# Patient Record
Sex: FEMALE | Race: WHITE | NOT HISPANIC OR LATINO | Employment: FULL TIME | URBAN - METROPOLITAN AREA
[De-identification: names, ages, dates, MRNs, and addresses within clinical notes are randomized per-mention and may not be internally consistent; named-entity substitution may affect disease eponyms.]

---

## 2021-03-16 ENCOUNTER — BULK ORDERING (OUTPATIENT)
Dept: CASE MANAGEMENT | Facility: OTHER | Age: 61
End: 2021-03-16

## 2021-03-16 DIAGNOSIS — Z23 IMMUNIZATION DUE: ICD-10-CM

## 2021-08-10 ENCOUNTER — OFFICE (OUTPATIENT)
Dept: URBAN - METROPOLITAN AREA CLINIC 66 | Facility: CLINIC | Age: 61
End: 2021-08-10

## 2021-08-10 VITALS
WEIGHT: 156 LBS | SYSTOLIC BLOOD PRESSURE: 110 MMHG | DIASTOLIC BLOOD PRESSURE: 68 MMHG | HEART RATE: 74 BPM | HEIGHT: 65 IN

## 2021-08-10 DIAGNOSIS — K58.9 IRRITABLE BOWEL SYNDROME WITHOUT DIARRHEA: ICD-10-CM

## 2021-08-10 DIAGNOSIS — R63.4 ABNORMAL WEIGHT LOSS: ICD-10-CM

## 2021-08-10 DIAGNOSIS — K80.20 CALCULUS OF GALLBLADDER WITHOUT CHOLECYSTITIS WITHOUT OBSTRU: ICD-10-CM

## 2021-08-10 PROCEDURE — 99243 OFF/OP CNSLTJ NEW/EST LOW 30: CPT | Performed by: NURSE PRACTITIONER

## 2021-08-10 RX ORDER — NORTRIPTYLINE HYDROCHLORIDE 50 MG/1
50 CAPSULE ORAL
Qty: 30 | Refills: 11 | Status: COMPLETED
Start: 2021-08-10 | End: 2022-11-22

## 2021-10-05 ENCOUNTER — OFFICE (OUTPATIENT)
Dept: URBAN - METROPOLITAN AREA CLINIC 66 | Facility: CLINIC | Age: 61
End: 2021-10-05

## 2021-10-05 VITALS
DIASTOLIC BLOOD PRESSURE: 67 MMHG | HEIGHT: 65 IN | WEIGHT: 154 LBS | SYSTOLIC BLOOD PRESSURE: 122 MMHG | HEART RATE: 67 BPM

## 2021-10-05 DIAGNOSIS — K58.0 IRRITABLE BOWEL SYNDROME WITH DIARRHEA: ICD-10-CM

## 2021-10-05 PROCEDURE — 99213 OFFICE O/P EST LOW 20 MIN: CPT | Performed by: NURSE PRACTITIONER

## 2021-10-25 ENCOUNTER — TRANSCRIBE ORDERS (OUTPATIENT)
Dept: DIABETES SERVICES | Facility: HOSPITAL | Age: 61
End: 2021-10-25

## 2021-10-25 DIAGNOSIS — K58.9 IRRITABLE BOWEL SYNDROME, UNSPECIFIED TYPE: Primary | ICD-10-CM

## 2021-10-26 ENCOUNTER — HOSPITAL ENCOUNTER (OUTPATIENT)
Dept: DIABETES SERVICES | Facility: HOSPITAL | Age: 61
Discharge: HOME | End: 2021-10-26
Admitting: NURSE PRACTITIONER

## 2021-10-26 PROCEDURE — 97802 MEDICAL NUTRITION INDIV IN: CPT

## 2022-01-04 ENCOUNTER — OFFICE (OUTPATIENT)
Dept: URBAN - METROPOLITAN AREA CLINIC 66 | Facility: CLINIC | Age: 62
End: 2022-01-04

## 2022-01-04 VITALS
HEIGHT: 65 IN | HEART RATE: 81 BPM | DIASTOLIC BLOOD PRESSURE: 72 MMHG | WEIGHT: 148 LBS | SYSTOLIC BLOOD PRESSURE: 119 MMHG

## 2022-01-04 DIAGNOSIS — R10.84 GENERALIZED ABDOMINAL PAIN: ICD-10-CM

## 2022-01-04 DIAGNOSIS — R14.0 ABDOMINAL DISTENSION (GASEOUS): ICD-10-CM

## 2022-01-04 DIAGNOSIS — R19.7 DIARRHEA, UNSPECIFIED: ICD-10-CM

## 2022-01-04 PROCEDURE — 99214 OFFICE O/P EST MOD 30 MIN: CPT | Performed by: NURSE PRACTITIONER

## 2022-01-04 RX ORDER — COLESTIPOL HYDROCHLORIDE 1 G/1
1 TABLET, FILM COATED ORAL
Qty: 30 | Refills: 11 | Status: COMPLETED
Start: 2022-01-04 | End: 2022-03-29

## 2022-02-07 ENCOUNTER — TELEHEALTH PROVIDED IN PATIENT'S HOME (OUTPATIENT)
Dept: URBAN - METROPOLITAN AREA TELEHEALTH 5 | Facility: TELEHEALTH | Age: 62
End: 2022-02-07

## 2022-02-07 VITALS — HEIGHT: 65 IN

## 2022-02-07 DIAGNOSIS — R19.7 DIARRHEA, UNSPECIFIED: ICD-10-CM

## 2022-02-07 DIAGNOSIS — R63.4 ABNORMAL WEIGHT LOSS: ICD-10-CM

## 2022-02-07 PROCEDURE — 99213 OFFICE O/P EST LOW 20 MIN: CPT | Mod: 95 | Performed by: NURSE PRACTITIONER

## 2022-03-29 ENCOUNTER — TELEHEALTH PROVIDED IN PATIENT'S HOME (OUTPATIENT)
Dept: URBAN - METROPOLITAN AREA TELEHEALTH 5 | Facility: TELEHEALTH | Age: 62
End: 2022-03-29

## 2022-03-29 VITALS — WEIGHT: 148 LBS | HEIGHT: 65 IN

## 2022-03-29 DIAGNOSIS — R19.7 DIARRHEA, UNSPECIFIED: ICD-10-CM

## 2022-03-29 PROCEDURE — 99214 OFFICE O/P EST MOD 30 MIN: CPT | Performed by: NURSE PRACTITIONER

## 2022-03-29 RX ORDER — COLESTIPOL HYDROCHLORIDE 1 G/1
1 TABLET, FILM COATED ORAL
Qty: 30 | Refills: 11 | Status: COMPLETED
Start: 2022-01-04 | End: 2022-03-29

## 2022-03-29 RX ORDER — NORTRIPTYLINE HYDROCHLORIDE 50 MG/1
50 CAPSULE ORAL
Qty: 30 | Refills: 11 | Status: COMPLETED
Start: 2021-08-10 | End: 2022-11-22

## 2022-04-20 ENCOUNTER — TELEHEALTH PROVIDED IN PATIENT'S HOME (OUTPATIENT)
Dept: URBAN - METROPOLITAN AREA TELEHEALTH 5 | Facility: TELEHEALTH | Age: 62
End: 2022-04-20

## 2022-04-20 VITALS — HEIGHT: 65 IN

## 2022-04-20 DIAGNOSIS — K58.0 IRRITABLE BOWEL SYNDROME WITH DIARRHEA: ICD-10-CM

## 2022-04-20 PROCEDURE — 99214 OFFICE O/P EST MOD 30 MIN: CPT | Mod: 95 | Performed by: NURSE PRACTITIONER

## 2022-06-01 ENCOUNTER — OFFICE (OUTPATIENT)
Dept: URBAN - METROPOLITAN AREA CLINIC 66 | Facility: CLINIC | Age: 62
End: 2022-06-01

## 2022-06-01 VITALS
HEART RATE: 80 BPM | HEIGHT: 65 IN | SYSTOLIC BLOOD PRESSURE: 104 MMHG | WEIGHT: 152 LBS | DIASTOLIC BLOOD PRESSURE: 62 MMHG

## 2022-06-01 DIAGNOSIS — R14.2 ERUCTATION: ICD-10-CM

## 2022-06-01 DIAGNOSIS — R14.0 ABDOMINAL DISTENSION (GASEOUS): ICD-10-CM

## 2022-06-01 PROCEDURE — 99214 OFFICE O/P EST MOD 30 MIN: CPT | Performed by: INTERNAL MEDICINE

## 2022-06-01 RX ORDER — ALOSETRON HYDROCHLORIDE 0.5 MG/1
1 TABLET ORAL
Qty: 60 | Refills: 11 | Status: COMPLETED
Start: 2022-06-01 | End: 2022-11-22

## 2022-07-18 ENCOUNTER — OFFICE (OUTPATIENT)
Dept: URBAN - METROPOLITAN AREA CLINIC 66 | Facility: CLINIC | Age: 62
End: 2022-07-18

## 2022-07-18 VITALS
DIASTOLIC BLOOD PRESSURE: 67 MMHG | SYSTOLIC BLOOD PRESSURE: 109 MMHG | HEART RATE: 80 BPM | WEIGHT: 152 LBS | HEIGHT: 65 IN

## 2022-07-18 DIAGNOSIS — R19.7 DIARRHEA, UNSPECIFIED: ICD-10-CM

## 2022-07-18 PROCEDURE — 99213 OFFICE O/P EST LOW 20 MIN: CPT | Performed by: NURSE PRACTITIONER

## 2022-09-20 ENCOUNTER — TELEHEALTH PROVIDED IN PATIENT'S HOME (OUTPATIENT)
Dept: URBAN - METROPOLITAN AREA TELEHEALTH 5 | Facility: TELEHEALTH | Age: 62
End: 2022-09-20

## 2022-09-20 VITALS — HEIGHT: 65 IN | WEIGHT: 152 LBS

## 2022-09-20 DIAGNOSIS — K58.0 IRRITABLE BOWEL SYNDROME WITH DIARRHEA: ICD-10-CM

## 2022-09-20 PROCEDURE — 99215 OFFICE O/P EST HI 40 MIN: CPT | Mod: 95 | Performed by: NURSE PRACTITIONER

## 2022-09-20 RX ORDER — ONDANSETRON 4 MG/1
12 TABLET, ORALLY DISINTEGRATING ORAL
Qty: 30 | Refills: 1 | Status: ACTIVE
Start: 2022-09-20

## 2022-09-20 RX ORDER — NORTRIPTYLINE HYDROCHLORIDE 50 MG/1
50 CAPSULE ORAL
Qty: 30 | Refills: 11 | Status: COMPLETED
Start: 2021-08-10 | End: 2022-11-22

## 2022-11-01 ENCOUNTER — AMBULATORY SURGICAL CENTER (OUTPATIENT)
Dept: URBAN - METROPOLITAN AREA SURGERY 20 | Facility: SURGERY | Age: 62
End: 2022-11-01

## 2022-11-01 ENCOUNTER — OFFICE (OUTPATIENT)
Dept: URBAN - METROPOLITAN AREA PATHOLOGY 4 | Facility: PATHOLOGY | Age: 62
End: 2022-11-01

## 2022-11-01 VITALS — HEIGHT: 65 IN

## 2022-11-01 DIAGNOSIS — K31.89 OTHER DISEASES OF STOMACH AND DUODENUM: ICD-10-CM

## 2022-11-01 DIAGNOSIS — K44.9 DIAPHRAGMATIC HERNIA WITHOUT OBSTRUCTION OR GANGRENE: ICD-10-CM

## 2022-11-01 DIAGNOSIS — K64.0 FIRST DEGREE HEMORRHOIDS: ICD-10-CM

## 2022-11-01 DIAGNOSIS — K29.70 GASTRITIS, UNSPECIFIED, WITHOUT BLEEDING: ICD-10-CM

## 2022-11-01 DIAGNOSIS — R63.4 ABNORMAL WEIGHT LOSS: ICD-10-CM

## 2022-11-01 DIAGNOSIS — R10.84 GENERALIZED ABDOMINAL PAIN: ICD-10-CM

## 2022-11-01 DIAGNOSIS — R19.7 DIARRHEA, UNSPECIFIED: ICD-10-CM

## 2022-11-01 DIAGNOSIS — A04.9 BACTERIAL INTESTINAL INFECTION, UNSPECIFIED: ICD-10-CM

## 2022-11-01 DIAGNOSIS — K58.0 IRRITABLE BOWEL SYNDROME WITH DIARRHEA: ICD-10-CM

## 2022-11-01 DIAGNOSIS — K63.5 POLYP OF COLON: ICD-10-CM

## 2022-11-01 PROBLEM — K22.9 DISEASE OF ESOPHAGUS, UNSPECIFIED: Status: ACTIVE | Noted: 2022-11-01

## 2022-11-01 LAB
GI HISTOLOGY: A. SELECT: (no result)
GI HISTOLOGY: B. UNSPECIFIED: (no result)
GI HISTOLOGY: C. SELECT: (no result)
GI HISTOLOGY: D. SELECT: (no result)
GI HISTOLOGY: PDF REPORT: (no result)

## 2022-11-01 PROCEDURE — 43239 EGD BIOPSY SINGLE/MULTIPLE: CPT | Performed by: INTERNAL MEDICINE

## 2022-11-01 PROCEDURE — 45380 COLONOSCOPY AND BIOPSY: CPT | Performed by: INTERNAL MEDICINE

## 2022-11-01 PROCEDURE — 88305 TISSUE EXAM BY PATHOLOGIST: CPT | Performed by: INTERNAL MEDICINE

## 2022-11-01 NOTE — SERVICEHPINOTES
has had ibs d,  used variety of meds.   stool tests suggest EPI.  colestipol caused constipation,  dicylomine immodium helps but feels poorly.  lotronex not feasible.   on creon currently   the colestipol with other rx led to constipation, the immodium at 3-4 a day helpful to function.  she is on creon, but still has meal related urgency

## 2022-11-22 ENCOUNTER — OFFICE (OUTPATIENT)
Dept: URBAN - METROPOLITAN AREA CLINIC 66 | Facility: CLINIC | Age: 62
End: 2022-11-22

## 2022-11-22 VITALS — HEIGHT: 65 IN

## 2022-11-22 DIAGNOSIS — K58.0 IRRITABLE BOWEL SYNDROME WITH DIARRHEA: ICD-10-CM

## 2022-11-22 DIAGNOSIS — K86.81 EXOCRINE PANCREATIC INSUFFICIENCY: ICD-10-CM

## 2022-11-22 PROCEDURE — 99213 OFFICE O/P EST LOW 20 MIN: CPT | Performed by: INTERNAL MEDICINE

## 2023-03-20 ENCOUNTER — TRANSCRIBE ORDERS (OUTPATIENT)
Dept: ADMINISTRATIVE | Facility: HOSPITAL | Age: 63
End: 2023-03-20
Payer: COMMERCIAL

## 2023-03-20 DIAGNOSIS — Z12.2 SCREENING FOR LUNG CANCER: Primary | ICD-10-CM

## 2023-06-01 ENCOUNTER — OFFICE (OUTPATIENT)
Dept: URBAN - METROPOLITAN AREA CLINIC 66 | Facility: CLINIC | Age: 63
End: 2023-06-01

## 2023-06-01 VITALS
WEIGHT: 160 LBS | DIASTOLIC BLOOD PRESSURE: 63 MMHG | SYSTOLIC BLOOD PRESSURE: 110 MMHG | HEIGHT: 65 IN | HEART RATE: 71 BPM

## 2023-06-01 DIAGNOSIS — E55.9 VITAMIN D DEFICIENCY, UNSPECIFIED: ICD-10-CM

## 2023-06-01 DIAGNOSIS — E53.8 DEFICIENCY OF OTHER SPECIFIED B GROUP VITAMINS: ICD-10-CM

## 2023-06-01 DIAGNOSIS — K86.81 EXOCRINE PANCREATIC INSUFFICIENCY: ICD-10-CM

## 2023-06-01 DIAGNOSIS — K91.5 POSTCHOLECYSTECTOMY SYNDROME: ICD-10-CM

## 2023-06-01 DIAGNOSIS — K58.0 IRRITABLE BOWEL SYNDROME WITH DIARRHEA: ICD-10-CM

## 2023-06-01 DIAGNOSIS — J44.9 CHRONIC OBSTRUCTIVE PULMONARY DISEASE, UNSPECIFIED: ICD-10-CM

## 2023-06-01 DIAGNOSIS — Z79.82 LONG TERM (CURRENT) USE OF ASPIRIN: ICD-10-CM

## 2023-06-01 DIAGNOSIS — R11.0 NAUSEA: ICD-10-CM

## 2023-06-01 DIAGNOSIS — A04.9 BACTERIAL INTESTINAL INFECTION, UNSPECIFIED: ICD-10-CM

## 2023-06-01 PROCEDURE — 99214 OFFICE O/P EST MOD 30 MIN: CPT | Performed by: NURSE PRACTITIONER

## 2023-06-01 RX ORDER — COLESTIPOL HYDROCHLORIDE 1 G/1
1 TABLET, FILM COATED ORAL
Qty: 30 | Refills: 9 | Status: COMPLETED
Start: 2023-01-06 | End: 2023-06-01

## 2023-06-05 ENCOUNTER — HOSPITAL ENCOUNTER (OUTPATIENT)
Dept: CT IMAGING | Facility: HOSPITAL | Age: 63
Discharge: HOME | End: 2023-06-05
Admitting: INTERNAL MEDICINE
Payer: COMMERCIAL

## 2023-06-05 DIAGNOSIS — Z12.2 SCREENING FOR LUNG CANCER: ICD-10-CM

## 2023-06-05 PROCEDURE — 71271 CT THORAX LUNG CANCER SCR C-: CPT

## 2023-06-13 ENCOUNTER — HOSPITAL ENCOUNTER (OUTPATIENT)
Facility: HOSPITAL | Age: 63
Setting detail: OBSERVATION
LOS: 1 days | Discharge: HOME | End: 2023-06-16
Attending: INTERNAL MEDICINE | Admitting: INTERNAL MEDICINE
Payer: COMMERCIAL

## 2023-06-13 DIAGNOSIS — R79.89 ELEVATED LFTS: ICD-10-CM

## 2023-06-13 DIAGNOSIS — K80.64 CALCULUS OF GALLBLADDER AND BILE DUCT WITH CHRONIC CHOLECYSTITIS WITHOUT OBSTRUCTION: ICD-10-CM

## 2023-06-13 DIAGNOSIS — R00.2 PALPITATIONS: ICD-10-CM

## 2023-06-13 DIAGNOSIS — K80.50 CHOLEDOCHOLITHIASIS: ICD-10-CM

## 2023-06-13 DIAGNOSIS — K80.10 CALCULUS OF GALLBLADDER WITH CHRONIC CHOLECYSTITIS WITHOUT OBSTRUCTION: Primary | ICD-10-CM

## 2023-06-13 PROBLEM — R07.9 CHEST PAIN: Status: ACTIVE | Noted: 2023-06-13

## 2023-06-13 LAB
CREAT SERPL-MCNC: 0.75 MG/DL (ref 0.57–1)
EGFRCR SERPLBLD CKD-EPI 2021: 89 ML/MIN/1.73
GEN 5 2HR TROPONIN T REFLEX: 9 NG/L
TROPONIN T DELTA: 2 NG/L
TROPONIN T SERPL HS-MCNC: 7 NG/L

## 2023-06-13 PROCEDURE — G0378 HOSPITAL OBSERVATION PER HR: HCPCS

## 2023-06-13 PROCEDURE — 94640 AIRWAY INHALATION TREATMENT: CPT

## 2023-06-13 PROCEDURE — 82565 ASSAY OF CREATININE: CPT | Performed by: INTERNAL MEDICINE

## 2023-06-13 PROCEDURE — 25010000002 ENOXAPARIN PER 10 MG: Performed by: INTERNAL MEDICINE

## 2023-06-13 PROCEDURE — 84484 ASSAY OF TROPONIN QUANT: CPT | Performed by: INTERNAL MEDICINE

## 2023-06-13 RX ORDER — CHOLECALCIFEROL (VITAMIN D3) 125 MCG
1 TABLET ORAL WEEKLY
COMMUNITY

## 2023-06-13 RX ORDER — ENOXAPARIN SODIUM 100 MG/ML
40 INJECTION SUBCUTANEOUS EVERY 24 HOURS
Status: DISCONTINUED | OUTPATIENT
Start: 2023-06-13 | End: 2023-06-15

## 2023-06-13 RX ORDER — AMOXICILLIN 250 MG
2 CAPSULE ORAL 2 TIMES DAILY
Status: DISCONTINUED | OUTPATIENT
Start: 2023-06-13 | End: 2023-06-16 | Stop reason: HOSPADM

## 2023-06-13 RX ORDER — ALBUTEROL SULFATE 2.5 MG/3ML
2.5 SOLUTION RESPIRATORY (INHALATION) EVERY 6 HOURS PRN
Status: DISCONTINUED | OUTPATIENT
Start: 2023-06-13 | End: 2023-06-16 | Stop reason: HOSPADM

## 2023-06-13 RX ORDER — NORTRIPTYLINE HYDROCHLORIDE 25 MG/1
25 CAPSULE ORAL NIGHTLY
COMMUNITY

## 2023-06-13 RX ORDER — ATORVASTATIN CALCIUM 20 MG/1
20 TABLET, FILM COATED ORAL DAILY
COMMUNITY

## 2023-06-13 RX ORDER — ALBUTEROL SULFATE 90 UG/1
2 AEROSOL, METERED RESPIRATORY (INHALATION) EVERY 4 HOURS PRN
COMMUNITY

## 2023-06-13 RX ORDER — DICYCLOMINE HYDROCHLORIDE 10 MG/1
10 CAPSULE ORAL EVERY 6 HOURS PRN
Status: DISCONTINUED | OUTPATIENT
Start: 2023-06-13 | End: 2023-06-16 | Stop reason: HOSPADM

## 2023-06-13 RX ORDER — POLYETHYLENE GLYCOL 3350 17 G/17G
17 POWDER, FOR SOLUTION ORAL DAILY PRN
Status: DISCONTINUED | OUTPATIENT
Start: 2023-06-13 | End: 2023-06-16 | Stop reason: HOSPADM

## 2023-06-13 RX ORDER — BUDESONIDE AND FORMOTEROL FUMARATE DIHYDRATE 160; 4.5 UG/1; UG/1
2 AEROSOL RESPIRATORY (INHALATION)
COMMUNITY

## 2023-06-13 RX ORDER — ACETAMINOPHEN 325 MG/1
650 TABLET ORAL EVERY 4 HOURS PRN
Status: DISCONTINUED | OUTPATIENT
Start: 2023-06-13 | End: 2023-06-16 | Stop reason: HOSPADM

## 2023-06-13 RX ORDER — UREA 10 %
3 LOTION (ML) TOPICAL NIGHTLY PRN
Status: DISCONTINUED | OUTPATIENT
Start: 2023-06-13 | End: 2023-06-16 | Stop reason: HOSPADM

## 2023-06-13 RX ORDER — ATORVASTATIN CALCIUM 20 MG/1
20 TABLET, FILM COATED ORAL DAILY
Status: DISCONTINUED | OUTPATIENT
Start: 2023-06-14 | End: 2023-06-16 | Stop reason: HOSPADM

## 2023-06-13 RX ORDER — BUDESONIDE AND FORMOTEROL FUMARATE DIHYDRATE 160; 4.5 UG/1; UG/1
2 AEROSOL RESPIRATORY (INHALATION)
Status: DISCONTINUED | OUTPATIENT
Start: 2023-06-13 | End: 2023-06-16 | Stop reason: HOSPADM

## 2023-06-13 RX ORDER — BISACODYL 5 MG/1
5 TABLET, DELAYED RELEASE ORAL DAILY PRN
Status: DISCONTINUED | OUTPATIENT
Start: 2023-06-13 | End: 2023-06-16 | Stop reason: HOSPADM

## 2023-06-13 RX ORDER — BISACODYL 10 MG
10 SUPPOSITORY, RECTAL RECTAL DAILY PRN
Status: DISCONTINUED | OUTPATIENT
Start: 2023-06-13 | End: 2023-06-16 | Stop reason: HOSPADM

## 2023-06-13 RX ORDER — DICYCLOMINE HYDROCHLORIDE 10 MG/1
10 CAPSULE ORAL AS NEEDED
COMMUNITY

## 2023-06-13 RX ORDER — ONDANSETRON 4 MG/1
4 TABLET, FILM COATED ORAL EVERY 6 HOURS PRN
Status: DISCONTINUED | OUTPATIENT
Start: 2023-06-13 | End: 2023-06-16 | Stop reason: HOSPADM

## 2023-06-13 RX ORDER — NORTRIPTYLINE HYDROCHLORIDE 25 MG/1
25 CAPSULE ORAL NIGHTLY
Status: DISCONTINUED | OUTPATIENT
Start: 2023-06-13 | End: 2023-06-16 | Stop reason: HOSPADM

## 2023-06-13 RX ORDER — ONDANSETRON 4 MG/1
4 TABLET, FILM COATED ORAL EVERY 8 HOURS PRN
Status: ON HOLD | COMMUNITY
End: 2023-06-16 | Stop reason: SDUPTHER

## 2023-06-13 RX ORDER — ONDANSETRON 2 MG/ML
4 INJECTION INTRAMUSCULAR; INTRAVENOUS EVERY 6 HOURS PRN
Status: DISCONTINUED | OUTPATIENT
Start: 2023-06-13 | End: 2023-06-16 | Stop reason: HOSPADM

## 2023-06-13 RX ADMIN — ENOXAPARIN SODIUM 40 MG: 100 INJECTION SUBCUTANEOUS at 21:28

## 2023-06-13 RX ADMIN — BUDESONIDE AND FORMOTEROL FUMARATE DIHYDRATE 2 PUFF: 160; 4.5 AEROSOL RESPIRATORY (INHALATION) at 22:10

## 2023-06-14 ENCOUNTER — INPATIENT HOSPITAL (OUTPATIENT)
Age: 63
End: 2023-06-14
Payer: COMMERCIAL

## 2023-06-14 ENCOUNTER — INPATIENT HOSPITAL (OUTPATIENT)
Dept: URBAN - METROPOLITAN AREA HOSPITAL 113 | Facility: HOSPITAL | Age: 63
End: 2023-06-14
Payer: COMMERCIAL

## 2023-06-14 ENCOUNTER — APPOINTMENT (OUTPATIENT)
Dept: CARDIOLOGY | Facility: HOSPITAL | Age: 63
End: 2023-06-14
Payer: COMMERCIAL

## 2023-06-14 ENCOUNTER — APPOINTMENT (OUTPATIENT)
Dept: NUCLEAR MEDICINE | Facility: HOSPITAL | Age: 63
End: 2023-06-14
Payer: COMMERCIAL

## 2023-06-14 ENCOUNTER — APPOINTMENT (OUTPATIENT)
Dept: ULTRASOUND IMAGING | Facility: HOSPITAL | Age: 63
End: 2023-06-14
Payer: COMMERCIAL

## 2023-06-14 DIAGNOSIS — R07.9 CHEST PAIN, UNSPECIFIED: ICD-10-CM

## 2023-06-14 PROBLEM — G47.33 OSA (OBSTRUCTIVE SLEEP APNEA): Chronic | Status: ACTIVE | Noted: 2023-06-14

## 2023-06-14 PROBLEM — R79.89 ELEVATED LFTS: Status: ACTIVE | Noted: 2023-06-14

## 2023-06-14 PROBLEM — E78.5 HYPERLIPIDEMIA: Chronic | Status: ACTIVE | Noted: 2023-06-14

## 2023-06-14 PROBLEM — R10.13 EPIGASTRIC PAIN: Status: ACTIVE | Noted: 2023-06-14

## 2023-06-14 PROBLEM — K58.9 IBS (IRRITABLE BOWEL SYNDROME): Chronic | Status: ACTIVE | Noted: 2023-06-14

## 2023-06-14 PROBLEM — J44.9 COPD (CHRONIC OBSTRUCTIVE PULMONARY DISEASE): Chronic | Status: ACTIVE | Noted: 2023-06-14

## 2023-06-14 PROBLEM — K80.20 CHOLELITHIASIS: Status: ACTIVE | Noted: 2023-06-14

## 2023-06-14 LAB
ALBUMIN SERPL-MCNC: 4.2 G/DL (ref 3.5–5.2)
ALP SERPL-CCNC: 152 U/L (ref 39–117)
ALT SERPL W P-5'-P-CCNC: 574 U/L (ref 1–33)
ANION GAP SERPL CALCULATED.3IONS-SCNC: 13.4 MMOL/L (ref 5–15)
AORTIC DIMENSIONLESS INDEX: 0.9 (DI)
ASCENDING AORTA: 2.6 CM
AST SERPL-CCNC: 136 U/L (ref 1–32)
BH CV ECHO MEAS - ACS: 2.06 CM
BH CV ECHO MEAS - AO MAX PG: 5.6 MMHG
BH CV ECHO MEAS - AO MEAN PG: 2.9 MMHG
BH CV ECHO MEAS - AO ROOT DIAM: 2.9 CM
BH CV ECHO MEAS - AO V2 MAX: 118.1 CM/SEC
BH CV ECHO MEAS - AO V2 VTI: 20.8 CM
BH CV ECHO MEAS - AVA(I,D): 2.2 CM2
BH CV ECHO MEAS - EDV(CUBED): 52.7 ML
BH CV ECHO MEAS - EDV(MOD-SP2): 68 ML
BH CV ECHO MEAS - EDV(MOD-SP4): 73 ML
BH CV ECHO MEAS - EF(MOD-BP): 62.4 %
BH CV ECHO MEAS - EF(MOD-SP2): 61.8 %
BH CV ECHO MEAS - EF(MOD-SP4): 64.4 %
BH CV ECHO MEAS - ESV(CUBED): 16 ML
BH CV ECHO MEAS - ESV(MOD-SP2): 26 ML
BH CV ECHO MEAS - ESV(MOD-SP4): 26 ML
BH CV ECHO MEAS - FS: 32.8 %
BH CV ECHO MEAS - IVS/LVPW: 1.13 CM
BH CV ECHO MEAS - IVSD: 0.99 CM
BH CV ECHO MEAS - LAT PEAK E' VEL: 7.9 CM/SEC
BH CV ECHO MEAS - LV MASS(C)D: 104.6 GRAMS
BH CV ECHO MEAS - LV MAX PG: 4 MMHG
BH CV ECHO MEAS - LV MEAN PG: 1.71 MMHG
BH CV ECHO MEAS - LV V1 MAX: 100.3 CM/SEC
BH CV ECHO MEAS - LV V1 VTI: 18.3 CM
BH CV ECHO MEAS - LVIDD: 3.7 CM
BH CV ECHO MEAS - LVIDS: 2.5 CM
BH CV ECHO MEAS - LVOT AREA: 2.5 CM2
BH CV ECHO MEAS - LVOT DIAM: 1.78 CM
BH CV ECHO MEAS - LVPWD: 0.88 CM
BH CV ECHO MEAS - MED PEAK E' VEL: 7 CM/SEC
BH CV ECHO MEAS - MR MAX PG: 77.5 MMHG
BH CV ECHO MEAS - MR MAX VEL: 440.1 CM/SEC
BH CV ECHO MEAS - MV A DUR: 0.15 SEC
BH CV ECHO MEAS - MV A MAX VEL: 92.1 CM/SEC
BH CV ECHO MEAS - MV DEC SLOPE: 346.5 CM/SEC2
BH CV ECHO MEAS - MV DEC TIME: 0.25 MSEC
BH CV ECHO MEAS - MV E MAX VEL: 55.3 CM/SEC
BH CV ECHO MEAS - MV E/A: 0.6
BH CV ECHO MEAS - MV MAX PG: 4.6 MMHG
BH CV ECHO MEAS - MV MEAN PG: 1.56 MMHG
BH CV ECHO MEAS - MV P1/2T: 70 MSEC
BH CV ECHO MEAS - MV V2 VTI: 23.9 CM
BH CV ECHO MEAS - MVA(P1/2T): 3.1 CM2
BH CV ECHO MEAS - MVA(VTI): 1.92 CM2
BH CV ECHO MEAS - PA ACC TIME: 0.12 SEC
BH CV ECHO MEAS - PA V2 MAX: 112.7 CM/SEC
BH CV ECHO MEAS - PULM A REVS DUR: 0.12 SEC
BH CV ECHO MEAS - PULM A REVS VEL: 60.1 CM/SEC
BH CV ECHO MEAS - PULM DIAS VEL: 29.6 CM/SEC
BH CV ECHO MEAS - PULM S/D: 1.33
BH CV ECHO MEAS - PULM SYS VEL: 39.3 CM/SEC
BH CV ECHO MEAS - QP/QS: 0.91
BH CV ECHO MEAS - RAP SYSTOLE: 3 MMHG
BH CV ECHO MEAS - RV MAX PG: 3.1 MMHG
BH CV ECHO MEAS - RV V1 MAX: 87.4 CM/SEC
BH CV ECHO MEAS - RV V1 VTI: 15.4 CM
BH CV ECHO MEAS - RVOT DIAM: 1.85 CM
BH CV ECHO MEAS - RVSP: 24 MMHG
BH CV ECHO MEAS - SV(LVOT): 45.8 ML
BH CV ECHO MEAS - SV(MOD-SP2): 42 ML
BH CV ECHO MEAS - SV(MOD-SP4): 47 ML
BH CV ECHO MEAS - SV(RVOT): 41.4 ML
BH CV ECHO MEAS - TAPSE (>1.6): 1.54 CM
BH CV ECHO MEAS - TR MAX PG: 21.3 MMHG
BH CV ECHO MEAS - TR MAX VEL: 230.8 CM/SEC
BH CV ECHO MEASUREMENTS AVERAGE E/E' RATIO: 7.42
BH CV XLRA - RV BASE: 2.41 CM
BH CV XLRA - RV LENGTH: 5.9 CM
BH CV XLRA - RV MID: 2.28 CM
BH CV XLRA - TDI S': 12.4 CM/SEC
BILIRUB CONJ SERPL-MCNC: <0.2 MG/DL (ref 0–0.3)
BILIRUB INDIRECT SERPL-MCNC: ABNORMAL MG/DL
BILIRUB SERPL-MCNC: 0.7 MG/DL (ref 0–1.2)
BUN SERPL-MCNC: 13 MG/DL (ref 8–23)
BUN/CREAT SERPL: 15.1 (ref 7–25)
CALCIUM SPEC-SCNC: 9.6 MG/DL (ref 8.6–10.5)
CHLORIDE SERPL-SCNC: 102 MMOL/L (ref 98–107)
CO2 SERPL-SCNC: 22.6 MMOL/L (ref 22–29)
CREAT SERPL-MCNC: 0.86 MG/DL (ref 0.57–1)
DEPRECATED RDW RBC AUTO: 43 FL (ref 37–54)
EGFRCR SERPLBLD CKD-EPI 2021: 75.5 ML/MIN/1.73
ERYTHROCYTE [DISTWIDTH] IN BLOOD BY AUTOMATED COUNT: 12.5 % (ref 12.3–15.4)
GLUCOSE SERPL-MCNC: 83 MG/DL (ref 65–99)
HCT VFR BLD AUTO: 40.3 % (ref 34–46.6)
HGB BLD-MCNC: 13.7 G/DL (ref 12–15.9)
LEFT ATRIUM VOLUME INDEX: 12 ML/M2
MCH RBC QN AUTO: 32.2 PG (ref 26.6–33)
MCHC RBC AUTO-ENTMCNC: 34 G/DL (ref 31.5–35.7)
MCV RBC AUTO: 94.6 FL (ref 79–97)
PLATELET # BLD AUTO: 329 10*3/MM3 (ref 140–450)
PMV BLD AUTO: 9.6 FL (ref 6–12)
POTASSIUM SERPL-SCNC: 3.7 MMOL/L (ref 3.5–5.2)
PROT SERPL-MCNC: 7 G/DL (ref 6–8.5)
QT INTERVAL: 369 MS
RBC # BLD AUTO: 4.26 10*6/MM3 (ref 3.77–5.28)
SINUS: 2.6 CM
SODIUM SERPL-SCNC: 138 MMOL/L (ref 136–145)
STJ: 2.24 CM
WBC NRBC COR # BLD: 7.83 10*3/MM3 (ref 3.4–10.8)

## 2023-06-14 PROCEDURE — 99221 1ST HOSP IP/OBS SF/LOW 40: CPT | Performed by: INTERNAL MEDICINE

## 2023-06-14 PROCEDURE — 99204 OFFICE O/P NEW MOD 45 MIN: CPT | Performed by: INTERNAL MEDICINE

## 2023-06-14 PROCEDURE — G0378 HOSPITAL OBSERVATION PER HR: HCPCS

## 2023-06-14 PROCEDURE — 94799 UNLISTED PULMONARY SVC/PX: CPT

## 2023-06-14 PROCEDURE — 80048 BASIC METABOLIC PNL TOTAL CA: CPT | Performed by: INTERNAL MEDICINE

## 2023-06-14 PROCEDURE — 25010000002 SINCALIDE PER 5 MCG: Performed by: INTERNAL MEDICINE

## 2023-06-14 PROCEDURE — 78227 HEPATOBIL SYST IMAGE W/DRUG: CPT

## 2023-06-14 PROCEDURE — 25010000002 ENOXAPARIN PER 10 MG: Performed by: INTERNAL MEDICINE

## 2023-06-14 PROCEDURE — 25510000001 PERFLUTREN (DEFINITY) 8.476 MG IN SODIUM CHLORIDE (PF) 0.9 % 10 ML INJECTION: Performed by: INTERNAL MEDICINE

## 2023-06-14 PROCEDURE — 76705 ECHO EXAM OF ABDOMEN: CPT

## 2023-06-14 PROCEDURE — 93306 TTE W/DOPPLER COMPLETE: CPT

## 2023-06-14 PROCEDURE — 85027 COMPLETE CBC AUTOMATED: CPT | Performed by: INTERNAL MEDICINE

## 2023-06-14 PROCEDURE — 94761 N-INVAS EAR/PLS OXIMETRY MLT: CPT

## 2023-06-14 PROCEDURE — A9537 TC99M MEBROFENIN: HCPCS | Performed by: STUDENT IN AN ORGANIZED HEALTH CARE EDUCATION/TRAINING PROGRAM

## 2023-06-14 PROCEDURE — 94664 DEMO&/EVAL PT USE INHALER: CPT

## 2023-06-14 PROCEDURE — 0 TECHNETIUM TC 99M MEBROFENIN KIT: Performed by: STUDENT IN AN ORGANIZED HEALTH CARE EDUCATION/TRAINING PROGRAM

## 2023-06-14 PROCEDURE — 93005 ELECTROCARDIOGRAM TRACING: CPT | Performed by: STUDENT IN AN ORGANIZED HEALTH CARE EDUCATION/TRAINING PROGRAM

## 2023-06-14 PROCEDURE — 80076 HEPATIC FUNCTION PANEL: CPT | Performed by: INTERNAL MEDICINE

## 2023-06-14 RX ORDER — KIT FOR THE PREPARATION OF TECHNETIUM TC 99M MEBROFENIN 45 MG/10ML
1 INJECTION, POWDER, LYOPHILIZED, FOR SOLUTION INTRAVENOUS
Status: COMPLETED | OUTPATIENT
Start: 2023-06-14 | End: 2023-06-14

## 2023-06-14 RX ADMIN — NORTRIPTYLINE HYDROCHLORIDE 25 MG: 25 CAPSULE ORAL at 00:06

## 2023-06-14 RX ADMIN — PANCRELIPASE 36000 UNITS OF LIPASE: 60000; 12000; 38000 CAPSULE, DELAYED RELEASE PELLETS ORAL at 12:21

## 2023-06-14 RX ADMIN — ATORVASTATIN CALCIUM 20 MG: 20 TABLET, FILM COATED ORAL at 11:09

## 2023-06-14 RX ADMIN — ENOXAPARIN SODIUM 40 MG: 100 INJECTION SUBCUTANEOUS at 20:45

## 2023-06-14 RX ADMIN — SODIUM CHLORIDE 1.4 MCG: 9 INJECTION, SOLUTION INTRAVENOUS at 09:29

## 2023-06-14 RX ADMIN — NORTRIPTYLINE HYDROCHLORIDE 25 MG: 25 CAPSULE ORAL at 20:45

## 2023-06-14 RX ADMIN — BUDESONIDE AND FORMOTEROL FUMARATE DIHYDRATE 2 PUFF: 160; 4.5 AEROSOL RESPIRATORY (INHALATION) at 20:17

## 2023-06-14 RX ADMIN — BUDESONIDE AND FORMOTEROL FUMARATE DIHYDRATE 2 PUFF: 160; 4.5 AEROSOL RESPIRATORY (INHALATION) at 07:00

## 2023-06-14 RX ADMIN — PERFLUTREN 3 ML: 6.52 INJECTION, SUSPENSION INTRAVENOUS at 14:56

## 2023-06-14 RX ADMIN — MEBROFENIN 1 DOSE: 45 INJECTION, POWDER, LYOPHILIZED, FOR SOLUTION INTRAVENOUS at 07:38

## 2023-06-14 NOTE — H&P
HISTORY AND PHYSICAL   [facility]        Date of Admission: 2023  Patient Identification:  Name: Nani Matias  Age: 62 y.o.  Sex: female  :  1960  MRN: [id number]                     Primary Care Physician: Robyn Morris MD    Chief Complaint:  62 year old female who presented to the emergency room with epigastric pain as well as palpitations; she had a first episode two days ago, another yesterday and was awakened with it this morning; she was seen at another ed and had gallstones and elevated transaminases; she has a history of ibs and is followed by dr mendez; she has no personal or family history of heart disease    History of Present Illness:   As above    Past Medical History:  Ibs  Copd  Sleep apnea  Past Surgical History:  History reviewed. No pertinent surgical history.   Home Meds:  Medications Prior to Admission   Medication Sig Dispense Refill Last Dose    atorvastatin (LIPITOR) 20 MG tablet Take 1 tablet by mouth Daily.   2023    budesonide-formoterol (SYMBICORT) 160-4.5 MCG/ACT inhaler Inhale 2 puffs 2 (Two) Times a Day.   2023    dicyclomine (BENTYL) 10 MG capsule Take 1 capsule by mouth As Needed for Abdominal Cramping.   2023    Ergocalciferol (Vitamin D2) 50 MCG (2000 UT) tablet Take 1 tablet by mouth 1 (One) Time Per Week.   Past Week    nortriptyline (PAMELOR) 25 MG capsule Take 1 capsule by mouth Every Night.   2023    pancrelipase, Lip-Prot-Amyl, (CREON) 93531-15349 units capsule delayed-release particles capsule Take 3 capsules by mouth 3 (Three) Times a Day With Meals.   Past Week    albuterol sulfate  (90 Base) MCG/ACT inhaler Inhale 2 puffs Every 4 (Four) Hours As Needed for Wheezing.   More than a month    ondansetron (ZOFRAN) 4 MG tablet Take 1 tablet by mouth Every 8 (Eight) Hours As Needed for Nausea or Vomiting.   More than a month       Allergies:  Allergies   Allergen Reactions    Penicillins Angioedema     "Shellfish-Derived Products Anaphylaxis     Immunizations:    There is no immunization history on file for this patient.  Social History:   Social History     Social History Narrative    Not on file     Social History     Socioeconomic History    Marital status:    Tobacco Use    Smoking status: Former     Types: Cigarettes     Quit date: 2009     Years since quittin.9    Smokeless tobacco: Never   Vaping Use    Vaping Use: Never used   Substance and Sexual Activity    Alcohol use: Never    Drug use: Never    Sexual activity: Defer       Family History:  History reviewed. No pertinent family history.     Review of Systems  See history of present illness and past medical history.  Patient denies headache, dizziness, syncope, falls, trauma, change in vision, change in hearing, change in taste, changes in weight, changes in appetite, focal weakness, numbness, or paresthesia.  Patient denies   dyspnea, orthopnea, PND, cough, sinus pressure, rhinorrhea, epistaxis, hemoptysis,    hematemesis, diarrhea, constipation or hematochezia.  Denies cold or heat intolerance, polydipsia, polyuria, polyphagia. Denies hematuria, pyuria, dysuria, hesitancy, frequency or urgency. Denies consumption of raw and under cooked meats foods or change in water source.  Denies fever, chills, sweats, night sweats.  Denies missing any routine medications. Remainder of ROS is negative.    Objective:  T Max 24 hrs: Temp (24hrs), Av.6 °F (36.4 °C), Min:97.6 °F (36.4 °C), Max:97.6 °F (36.4 °C)    Vitals Ranges:   Temp:  [97.6 °F (36.4 °C)] 97.6 °F (36.4 °C)  Heart Rate:  [84-89] 89  Resp:  [16] 16  BP: (119)/(75) 119/75      Exam:  /75 (BP Location: Left arm, Patient Position: Lying)   Pulse 89   Temp 97.6 °F (36.4 °C) (Oral)   Resp 16   Ht 163 cm (64.17\")   Wt 68.3 kg (150 lb 9.2 oz)   SpO2 97%   BMI 25.71 kg/m²     General Appearance:    Alert, cooperative, no distress, appears stated age   Head:    Normocephalic, " without obvious abnormality, atraumatic   Eyes:    PERRL, conjunctivae/corneas clear, EOM's intact, both eyes   Ears:    Normal external ear canals, both ears   Nose:   Nares normal, septum midline, mucosa normal, no drainage    or sinus tenderness   Throat:   Lips, mucosa, and tongue normal   Neck:   Supple, symmetrical, trachea midline, no adenopathy;     thyroid:  no enlargement/tenderness/nodules; no carotid    bruit or JVD   Back:     Symmetric, no curvature, ROM normal, no CVA tenderness   Lungs:     Decreased breath sounds bilaterally, respirations unlabored   Chest Wall:    No tenderness or deformity    Heart:    Regular rate and rhythm, S1 and S2 normal, no murmur, rub   or gallop   Abdomen:     Soft, nontender, bowel sounds active all four quadrants,     no masses, no hepatomegaly, no splenomegaly   Extremities:   Extremities normal, atraumatic, no cyanosis or edema   Pulses:   2+ and symmetric all extremities   Skin:   Skin color, texture, turgor normal, no rashes or lesions               .    Data Review:  Labs in chart were reviewed.             Imaging Results (All)       None              Assessment:  Active Hospital Problems    Diagnosis  POA    **Chest pain [R07.9]  Yes      Resolved Hospital Problems   No resolved problems to display.   Nausea and vomiting  Copd  Sleep apnea  Hyperlipidemia  Ibs      Plan:  Will get a hida scan  Check echo  Repeat troponin  First was negative at the other facility  Monitor on telemetry  Dw patient and ed provider    Sarah Landry MD  6/13/2023  20:09 EDT

## 2023-06-14 NOTE — CONSULTS
[facility]   Consult Note    Patient Name: Nani Matias  : 1960  MRN: [id number]  Primary Care Physician:  Robyn Morris MD  Referring Physician: Dirk Lott MD  Date of admission: 2023    Inpatient Gastroenterology Consult  Consult performed by: Patrick Simmons MD  Consult ordered by: Sarah Landry MD      Subjective   Subjective     Reason for Consult/ Chief Complaint: abd pain     History of Present Illness  Nani Matias is a 62 y.o. female patient presented with upper abdominal pain.  This was upper middle to right side, did radiate to her back.  Some nausea/ vomiting and diarrhea.   She has had irritable bowel syndrome.  She did have a bump in liver function tests that are improving with these symptoms . She has had gallstones. And has seen dru brown in the past but it was felt at the time they were not an issue as her known irritable bowel syndrome was active.  No tea colored urine or pale stools .  She did have upper and lower tract scopes 2022 were not remarkable     Review of Systems   Gastrointestinal:  Positive for abdominal pain.      Personal History     History reviewed. No pertinent past medical history.    History reviewed. No pertinent surgical history.    Family History: family history is not on file. Otherwise pertinent FHx was reviewed and not pertinent to current issue.    Social History:  reports that she quit smoking about 13 years ago. Her smoking use included cigarettes. She has never used smokeless tobacco. She reports that she does not drink alcohol and does not use drugs.    Home Medications:   Vitamin D2, albuterol sulfate HFA, atorvastatin, budesonide-formoterol, dicyclomine, nortriptyline, ondansetron, and pancrelipase (Lip-Prot-Amyl)    Allergies:  Allergies   Allergen Reactions    Penicillins Angioedema    Shellfish-Derived Products Anaphylaxis       Objective    Objective     Vitals:  Temp:  [97.6 °F (36.4 °C)-98.3 °F (36.8 °C)]  98.3 °F (36.8 °C)  Heart Rate:  [79-90] 84  Resp:  [16-20] 18  BP: (109-119)/(62-76) 112/62    Physical Exam  HENT:      Right Ear: External ear normal.      Left Ear: External ear normal.      Mouth/Throat:      Pharynx: Oropharynx is clear.   Eyes:      Conjunctiva/sclera: Conjunctivae normal.   Cardiovascular:      Rate and Rhythm: Normal rate.   Pulmonary:      Effort: Pulmonary effort is normal.   Abdominal:      General: Abdomen is flat.   Skin:     General: Skin is warm and dry.   Neurological:      General: No focal deficit present.      Mental Status: She is alert.   Psychiatric:         Mood and Affect: Mood normal.       Result Review    Result Review:  I have personally reviewed the results from the time of this admission to 6/14/2023 07:58 EDT and agree with these findings:  []  Laboratory list / accordion  []  Microbiology  []  Radiology  []  EKG/Telemetry   []  Cardiology/Vascular   []  Pathology  []  Old records  []  Other:  Most notable findings include:       Assessment & Plan   Assessment / Plan     Brief Patient Summary:  Nani Matias is a 62 y.o. female who   Abd pain concern over biliary colic given the symptoms and increased liver function tests  Irritable bowel syndrome     Active Hospital Problems:  Active Hospital Problems    Diagnosis     **Chest pain      Plan: agree with hida scan  Consider surgical opinion  Patient was asking about cardiology input, I have messaged the admitting md on this , however this may be biliary colic given the presentation       Patrick Simmons MD

## 2023-06-14 NOTE — PROGRESS NOTES
Name: Nani Matias ADMIT: 2023   : 1960  PCP: Robyn Morris MD    MRN: [id number] LOS: 1 days   AGE/SEX: 62 y.o. female  ROOM: [facility]/     Subjective   Subjective     She reports that her abdominal pain is improved today and overall she feels a bit better. She requested a cardiology evaluation with regard to her palpitations and this has been ordered. Getting more history on her chest/epigastric pain and palpitations, it seems that the epigastric pain woke her from sleep a few of nights ago, and then she had palpitations that woke her from sleep the night prior to admission. The epigastric pain seems to be associated with eating, and the palpitations are sometimes associated with pain or cold sweats. She does endorse some occasional very mild fleeting chest discomfort that is pressure like, but it only lasts a moment, is non-radiating, and not worsened or improved by anything. She denies any worsening orthopnea or exertional dyspnea (she has both at baseline from her COPD) or lower extremity swelling. Her hida scan this morning was normal       Objective   Objective   Vital Signs  Temp:  [97.6 °F (36.4 °C)-98.3 °F (36.8 °C)] 98.3 °F (36.8 °C)  Heart Rate:  [79-90] 84  Resp:  [16-20] 18  BP: (109-119)/(62-76) 112/62  SpO2:  [94 %-97 %] 97 %  on   ;   Device (Oxygen Therapy): room air  Body mass index is 26.24 kg/m².  Physical Exam  Constitutional:       General: She is not in acute distress.     Appearance: She is not toxic-appearing.   Cardiovascular:      Rate and Rhythm: Normal rate and regular rhythm.      Heart sounds: Normal heart sounds.   Pulmonary:      Effort: Pulmonary effort is normal.      Breath sounds: Normal breath sounds.   Abdominal:      General: Bowel sounds are normal.      Palpations: Abdomen is soft.   Musculoskeletal:         General: No tenderness.      Right lower leg: No edema.      Left lower leg: No edema.   Neurological:      Mental Status: She is alert.    Psychiatric:         Mood and Affect: Mood normal.         Behavior: Behavior normal.       Results Review     I reviewed the patient's new clinical results.  Results from last 7 days   Lab Units 06/14/23  0320   WBC 10*3/mm3 7.83   HEMOGLOBIN g/dL 13.7   PLATELETS 10*3/mm3 329     Results from last 7 days   Lab Units 06/14/23  0320 06/13/23  1907   SODIUM mmol/L 138  --    POTASSIUM mmol/L 3.7  --    CHLORIDE mmol/L 102  --    CO2 mmol/L 22.6  --    BUN mg/dL 13  --    CREATININE mg/dL 0.86 0.75   GLUCOSE mg/dL 83  --    Estimated Creatinine Clearance: 63.5 mL/min (by C-G formula based on SCr of 0.86 mg/dL).  Results from last 7 days   Lab Units 06/14/23  0320   ALBUMIN g/dL 4.2   BILIRUBIN mg/dL 0.7   ALK PHOS U/L 152*   AST (SGOT) U/L 136*   ALT (SGPT) U/L 574*     Results from last 7 days   Lab Units 06/14/23  0320   CALCIUM mg/dL 9.6   ALBUMIN g/dL 4.2     No results found for: COVID19  No results found for: HGBA1C, POCGLU    NM HIDA SCAN WITH PHARMACOLOGICAL INTERVENTION  Narrative: NUCLEAR MEDICINE HIDA SCAN WITH PHARMACOLOGIC INTERVENTION     HISTORY: Abdominal pain.     TECHNIQUE: 45 minute anterior dynamic imaging over the abdomen was  performed after injection of 5.7 mCi Tc-choletec IV.  1.4 mcg Kinevac  was administered IV to determine gallbladder ejection fraction.     FINDINGS: There is normal hepatic uptake and excretion with appropriate  clearance of background blood pool activity. There is normal  visualization of biliary tract, gallbladder, small bowel. After CCK  infusion, gallbladder ejection fraction is calculated to be  91%   (normal range is considered 35% or greater)     Impression: 1. No common duct or cystic duct obstruction.  2. Gallbladder ejection fraction measures 91 % which is normal.     This report was finalized on 6/14/2023 12:12 PM by Dr. Scott Rich M.D.       Scheduled Medications  atorvastatin, 20 mg, Oral, Daily  budesonide-formoterol, 2 puff, Inhalation, BID -  RT  enoxaparin, 40 mg, Subcutaneous, Q24H  nortriptyline, 25 mg, Oral, Nightly  pancrelipase (Lip-Prot-Amyl), 36,000 units of lipase, Oral, TID With Meals  senna-docusate sodium, 2 tablet, Oral, BID    Infusions   Diet  Diet: Cardiac Diets; Healthy Heart (2-3 Na+); Texture: Regular Texture (IDDSI 7); Fluid Consistency: Thin (IDDSI 0)       Assessment/Plan     Active Hospital Problems    Diagnosis  POA    **Chest pain [R07.9]  Yes    Epigastric pain [R10.13]  Yes    IBS (irritable bowel syndrome) [K58.9]  Yes    COPD (chronic obstructive pulmonary disease) [J44.9]  Yes    Hyperlipidemia [E78.5]  Yes    OZ (obstructive sleep apnea) [G47.33]  Yes    Cholelithiasis [K80.20]  Yes    Elevated LFTs [R79.89]  Yes      Resolved Hospital Problems   No resolved problems to display.       62 y.o. female admitted with Chest pain.    Epigastric pain with elevated LFTs and sludge or small stones in the gallbladder on CT-hida scan was negative. General surgery has been consulted  Chest discomfort with palpitations-non-cardiac chest pain by description. Her EKG is without any ST deviation or t wave inversion. Troponin has been negative multiple times. An echocardiogram is pending and cardiology has been consulted.  COPD-symbicort  Hyperlipidemia-atorvastatin  IBS-nortriptyline   OZ-cpap if available  SCDs for DVT prophylaxis.  Full code.  Discussed with patient, nursing staff, and consulting provider.  Anticipate discharge home timing yet to be determined.      Beau Alicea MD  [facility]  06/14/23  12:55 EDT    I wore protective equipment throughout this patient encounter including a face mask, gloves and protective eyewear.  Hand hygiene was performed before donning protective equipment and after removal when leaving the room.

## 2023-06-14 NOTE — CONSULTS
"Nutrition Services    Patient Name:  Nani Matias  YOB: 1960  MRN: [id number]  Admit Date:  6/13/2023    Assessment Date:  06/14/23    NUTRITION SCREENING      Reason for Encounter Nurse Admission Screen    Diagnosis/Problem Chest pain     Nutrition following for nurse admission screen. Patient admitted with N/V/D and epigastric pain along with chest pain. She has a hx of IBS and is followed for Dr. Simmons. Patient was found to have gallstones and elevated transaminases when she went to the immediate care. No significant wt loss, per wt wt hx. Patient upgraded from clears to regular diet. Will monitor po intake and follow up for interview. Patient currently in cardiology.        PO Diet Diet: Cardiac Diets; Healthy Heart (2-3 Na+); Texture: Regular Texture (IDDSI 7); Fluid Consistency: Thin (IDDSI 0)   PO Supplements/Snacks    PO Intake % No intakes        Labs  Listed below, reviewed   Physical Findings Alert, oriented, overweight   GI Function Last BM 6/13    Skin Status Skin intact        Height:  Weight:  BMI:   Category  Weight Trend Height: 163 cm (64.17\")  Weight: 69 kg (152 lb 1.9 oz) (06/14/23 1455)  Body mass index is 25.97 kg/m².  Overweight (25 - 29.9)  Stable       Intervention/Plan Will continue to follow          Results from last 7 days   Lab Units 06/14/23  0320 06/13/23  1907   SODIUM mmol/L 138  --    POTASSIUM mmol/L 3.7  --    CHLORIDE mmol/L 102  --    CO2 mmol/L 22.6  --    BUN mg/dL 13  --    CREATININE mg/dL 0.86 0.75   CALCIUM mg/dL 9.6  --    BILIRUBIN mg/dL 0.7  --    ALK PHOS U/L 152*  --    ALT (SGPT) U/L 574*  --    AST (SGOT) U/L 136*  --    GLUCOSE mg/dL 83  --      Results from last 7 days   Lab Units 06/14/23  0320   HEMOGLOBIN g/dL 13.7   HEMATOCRIT % 40.3     No results found for: COVID19  No results found for: HGBA1C    RD to follow up per protocol.    Electronically signed by:  Eve Reyes RD  06/14/23 15:11 EDT  "

## 2023-06-14 NOTE — CONSULTS
Patient Name: Nani Matias  :1960  62 y.o.    Date of Admission: 2023  Encounter Provider: Rivka Lucio MD  Date of Encounter Visit: 23  Place of Service: [facility] CARDIOLOGY  Referring Provider: Dirk Lott MD  Patient Care Team:  Robyn Morris MD as PCP - General  Robyn Morris MD as PCP - Family Medicine      Chief complaint:  Palpitations    History of Present Illness:    This lady has COPD and hyperlipidemia.  She had a fleeting episode of chest discomfort.  She has had more epigastric type pain.  She has not vomited.  Her main complaint from a cardiac standpoint is palpitations.  These have woken her up from sleep.  She feels her heart going fast and its not comfortable.  It does not cause chest pain.    History reviewed. No pertinent past medical history.    History reviewed. No pertinent surgical history.      Prior to Admission medications    Medication Sig Start Date End Date Taking? Authorizing Provider   atorvastatin (LIPITOR) 20 MG tablet Take 1 tablet by mouth Daily.   Yes Jada Bazan MD   budesonide-formoterol (SYMBICORT) 160-4.5 MCG/ACT inhaler Inhale 2 puffs 2 (Two) Times a Day.   Yes Jada Bazan MD   dicyclomine (BENTYL) 10 MG capsule Take 1 capsule by mouth As Needed for Abdominal Cramping.   Yes Jada Bazan MD   Ergocalciferol (Vitamin D2) 50 MCG (2000 UT) tablet Take 1 tablet by mouth 1 (One) Time Per Week.   Yes Jada Bazan MD   nortriptyline (PAMELOR) 25 MG capsule Take 1 capsule by mouth Every Night.   Yes Jada Bazan MD   pancrelipase, Lip-Prot-Amyl, (CREON) 81741-06326 units capsule delayed-release particles capsule Take 3 capsules by mouth 3 (Three) Times a Day With Meals.   Yes Jada Bazan MD   albuterol sulfate  (90 Base) MCG/ACT inhaler Inhale 2 puffs Every 4 (Four) Hours As Needed for Wheezing.    Jada Bazan MD   ondansetron (ZOFRAN)  4 MG tablet Take 1 tablet by mouth Every 8 (Eight) Hours As Needed for Nausea or Vomiting.    Provider, Historical, MD       Allergies   Allergen Reactions    Penicillins Angioedema    Shellfish-Derived Products Anaphylaxis       Social History     Socioeconomic History    Marital status:    Tobacco Use    Smoking status: Former     Types: Cigarettes     Quit date: 2009     Years since quittin.9    Smokeless tobacco: Never   Vaping Use    Vaping Use: Never used   Substance and Sexual Activity    Alcohol use: Never    Drug use: Never    Sexual activity: Defer       History reviewed. No pertinent family history.    REVIEW OF SYSTEMS:   All other systems reviewed and negative.        Objective:     Vitals:    23 2344 23 0516 23 0700 23 1326   BP: 112/62   116/81   BP Location: Right arm   Left arm   Patient Position: Sitting   Sitting   Pulse: 79  84 112   Resp: 20  18 18   Temp: 98.3 °F (36.8 °C)   97.4 °F (36.3 °C)   TempSrc: Oral   Oral   SpO2: 94%  97% 93%   Weight:  69.7 kg (153 lb 11.2 oz)     Height:         Body mass index is 26.24 kg/m².    Intake/Output Summary (Last 24 hours) at 2023 1432  Last data filed at 2023 1055  Gross per 24 hour   Intake 120 ml   Output --   Net 120 ml           Lab Review:     Results from last 7 days   Lab Units 23  0320   SODIUM mmol/L 138   POTASSIUM mmol/L 3.7   CHLORIDE mmol/L 102   CO2 mmol/L 22.6   BUN mg/dL 13   CREATININE mg/dL 0.86   CALCIUM mg/dL 9.6   BILIRUBIN mg/dL 0.7   ALK PHOS U/L 152*   ALT (SGPT) U/L 574*   AST (SGOT) U/L 136*   GLUCOSE mg/dL 83     Results from last 7 days   Lab Units 23  2117 23  1907   HSTROP T ng/L 9 7     Results from last 7 days   Lab Units 23  0320   WBC 10*3/mm3 7.83   HEMOGLOBIN g/dL 13.7   HEMATOCRIT % 40.3   PLATELETS 10*3/mm3 329                 I personally viewed and interpreted the patient's EKG/Telemetry data.        Assessment and Plan:       Chest pain.   Atypical.  No ischemic changes on EKG.  High-sensitivity troponin negative x2.  Low suspicion for angina.  Palpitations.  I reviewed the telemetry and I do not really see anything besides some sinus tachycardia.  EKG looks normal.  LV function is normal.  Place Zio patch at discharge.  Abdominal pain.  Being followed by GI.  Possible biliary colic.  COPD.  Hyperlipidemia.  Obstructive sleep apnea    No further cardiac testing is needed at this time.  Low risk for cardiovascular event should she need surgery.    Rivka Lucio MD  06/14/23  14:32 EDT

## 2023-06-14 NOTE — CONSULTS
General Surgery Consultation    Consulting Physician: Karuna Juárez MD  Referring Physician: Sarah Landry MD    Reason for consultation: gallstones    CC: Abdominal pain    HPI:   The patient is a very pleasant 62 y.o. female that presented to the hospital with acute onset epigastric abdominal pain coupled with nausea, vomiting, and diarrhea that occurred a couple of days ago.  She then has had intermittent tachycardia with her smart watch waking her from sleep last night to notify her of her heart rate being in the 130s.  She went to [facility] emergency room where for some reason she underwent a CT abdomen/pelvis that demonstrated gallstones and sludge.  She was then transferred here where blood work demonstrated elevated AST and ALT (136 and 574 respectively).  She has had gallstones for many years, but never had it operated on by Dr. Eulalio Umana due to her concomitant IBS-D.     Past Medical History:  COPD  Hyperlipidemia  Irritable bowel syndrome    Past Surgical History:  EGD & colonoscopy (November 2022, Dr. Simmons)    Medications:  Medications Prior to Admission   Medication Sig Dispense Refill Last Dose    atorvastatin (LIPITOR) 20 MG tablet Take 1 tablet by mouth Daily.   6/12/2023    budesonide-formoterol (SYMBICORT) 160-4.5 MCG/ACT inhaler Inhale 2 puffs 2 (Two) Times a Day.   6/13/2023    dicyclomine (BENTYL) 10 MG capsule Take 1 capsule by mouth As Needed for Abdominal Cramping.   6/12/2023    Ergocalciferol (Vitamin D2) 50 MCG (2000 UT) tablet Take 1 tablet by mouth 1 (One) Time Per Week.   Past Week    nortriptyline (PAMELOR) 25 MG capsule Take 1 capsule by mouth Every Night.   6/12/2023    pancrelipase, Lip-Prot-Amyl, (CREON) 52379-30741 units capsule delayed-release particles capsule Take 3 capsules by mouth 3 (Three) Times a Day With Meals.   Past Week    albuterol sulfate  (90 Base) MCG/ACT inhaler Inhale 2 puffs Every 4 (Four) Hours As Needed for Wheezing.   More than a month     ondansetron (ZOFRAN) 4 MG tablet Take 1 tablet by mouth Every 8 (Eight) Hours As Needed for Nausea or Vomiting.   More than a month       Allergies: Penicillin (angioedema), shellfish (anaphylaxis)    Social History: , former smoker, no regular alcohol use    Family History: Noncontributory    Review of Systems:  Constitutional: denies any weight changes, fatigue, or weakness  Eyes: denies blurred/double vision or scleral icterus  Cardiovascular: denies chest pain, palpitations, or edemas  Respiratory: denies cough, sputum, or dyspnea  Gastrointestinal: Positive for abdominal pain, nausea, vomiting, and diarrhea; denies melena or hematochezia  Genitourinary: denies dysuria or hematuria  Endocrine: denies cold intolerance, lethargy, or flushing  Hematologic: denies excessive bruising or bleeding  Musculoskeletal: denies weakness, joint swelling, joint pain, or stiffness  Neurologic: denies seizures, CVA, paresthesia, or peripheral neuropathy  Skin: denies change in nevi, rashes, masses, or jaundice     All other systems reviewed and were negative.    Physical Exam:   Vitals:    06/14/23 1455   BP: 116/81   Pulse: 81   Resp:    Temp:    SpO2:      Height: 163 cm  Weight: 69 kg  BMI: 25.97  GENERAL: awake and alert, no acute distress, oriented to person, place, and time  HEENT: normocephalic, atraumatic, no scleral icterus, moist mucous membranes  NECK: Supple, there is no thyromegaly or lymphadenopathy  RESPIRATORY: clear to auscultation, no wheezes, rales or rhonchi, symmetric air entry  CARDIOVASCULAR: regular rate and rhythm    GASTROINTESTINAL: Soft, nontender, nondistended  MUSCULOSKELETAL: no cyanosis, clubbing, or edema   NEUROLOGIC: alert and oriented, normal speech, cranial nerves 2-12 grossly intact, no focal deficits   SKIN: Moist, warm, no rashes, no jaundice      Diagnostic workup:     Pertinent labs:   Results from last 7 days   Lab Units 06/14/23  0320   WBC 10*3/mm3 7.83   HEMOGLOBIN g/dL 13.7    HEMATOCRIT % 40.3   PLATELETS 10*3/mm3 329     Results from last 7 days   Lab Units 06/14/23  0320 06/13/23  1907   SODIUM mmol/L 138  --    POTASSIUM mmol/L 3.7  --    CHLORIDE mmol/L 102  --    CO2 mmol/L 22.6  --    BUN mg/dL 13  --    CREATININE mg/dL 0.86 0.75   CALCIUM mg/dL 9.6  --    BILIRUBIN mg/dL 0.7  --    ALK PHOS U/L 152*  --    ALT (SGPT) U/L 574*  --    AST (SGOT) U/L 136*  --    GLUCOSE mg/dL 83  --        IMAGING:  CT ABD/PELVIS ([facility]):  IMPRESSION:   No clearly acute finding.  Sludge or small stones in the gallbladder without definite CT evidence for active complication. If the patient has symptoms referrable to this region, ultrasound may be helpful.     HIDA SCAN:  IMPRESSION:  1. No common duct or cystic duct obstruction.  2. Gallbladder ejection fraction measures 91 % which is normal.    Assessment and plan:     The patient is a 62 y.o. female with cholelithiasis and elevated LFTs concerning for acute cholecystitis versus choledocholithiasis    I reviewed the CT report from [facility] emergency room and the HIDA scan done today, which show small gallstones with no evidence for cholecystitis.  There is no evidence for cystic duct or common duct obstruction on her HIDA scan either.  However, given her AST and ALT elevation I am concerned she could have choledocholithiasis.  I have ordered a stat gallbladder ultrasound to be done tonight and I recommended we tentatively schedule laparoscopic cholecystectomy with cholangiogram tomorrow.  She and her  asked multiple questions, all of which were answered to their satisfaction and they are in agreement with this plan.  She should remain n.p.o. at midnight tonight.  I discussed this plan with Dr. Alicea.    Karuna Juárez MD  General, Robotic, and Endoscopic Surgery  [facility]    [address], [address]  [place] [address]  P: [phone]  F: [phone]

## 2023-06-14 NOTE — PROGRESS NOTES
"[facility] Clinical [facility]acy Services: Enoxaparin Consult    Nani BRENNER Chelekevin has a pharmacy consult to dose prophylactic enoxaparin per Dr. Landry's request.     Indication: VTE Prophylaxis  Home Anticoagulation: none     Relevant clinical data and objective history reviewed:  62 y.o. female 163 cm (64.17\") 68.3 kg (150 lb 9.2 oz)   Body mass index is 25.71 kg/m².     Estimated Creatinine Clearance: 72.3 mL/min (by C-G formula based on SCr of 0.75 mg/dL).    Assessment/Plan    Will start patient on 40mg subcutaneous every 24 hours, adjusted for renal function. Consult order will be discontinued but pharmacy will continue to follow.     Samanta Bush, [facility]  Clinical Pharmacist    "

## 2023-06-14 NOTE — PROGRESS NOTES
Discharge Planning Assessment  [facility]     Patient Name: Nani Matias  MRN: [id number]  Today's Date: 6/14/2023    Admit Date: 6/13/2023    Plan: Home with spouse   Discharge Needs Assessment       Row Name 06/14/23 1028       Living Environment    People in Home spouse    Name(s) of People in Home Bud    Current Living Arrangements home    Primary Care Provided by self    Provides Primary Care For no one    Family Caregiver if Needed spouse    Quality of Family Relationships helpful;involved;supportive    Able to Return to Prior Arrangements yes       Resource/Environmental Concerns    Resource/Environmental Concerns none       Transition Planning    Patient/Family Anticipates Transition to home with family    Patient/Family Anticipated Services at Transition none    Transportation Anticipated family or friend will provide       Discharge Needs Assessment    Readmission Within the Last 30 Days no previous admission in last 30 days    Equipment Currently Used at Home cpap    Concerns to be Addressed denies needs/concerns at this time;no discharge needs identified    Equipment Needed After Discharge none    Provided Post Acute Provider List? N/A    Provided Post Acute Provider Quality & Resource List? N/A                   Discharge Plan       Row Name 06/14/23 1031       Plan    Plan Home with spouse    Patient/Family in Agreement with Plan yes    Plan Comments Met with patient at the bedside, facesheet verified. Patient lives with her spouse, Bud. She works a full time job and she is IADL. Patient uses a home CPAP, no other DME. PCP is Dr. Robyn Morris and [facility] Patient is planning to return home with the help of her spouse, she denies dc needs at this time. Spouse will transport home.                  Continued Care and Services - Admitted Since 6/13/2023    Coordination has not been started for this encounter.       Expected Discharge Date and Time        Expected Discharge Date Expected Discharge Time    Jun 14, 2023            Demographic Summary    No documentation.                  Functional Status       Row Name 06/14/23 1030       Employment/    Employment Status employed full-time      Row Name 06/14/23 1029       Functional Status    Usual Activity Tolerance good       Assessment of Health Literacy    Health Literacy Excellent       Functional Status, IADL    Medications independent    Meal Preparation independent    Housekeeping independent    Laundry independent    Shopping independent       Mental Status    General Appearance WDL WDL       Mental Status Summary    Recent Changes in Mental Status/Cognitive Functioning no changes                   Psychosocial    No documentation.                  Abuse/Neglect    No documentation.                  Legal    No documentation.                  Substance Abuse    No documentation.                  Patient Forms    No documentation.                     Estela Walker, RN

## 2023-06-14 NOTE — PLAN OF CARE
Goal Outcome Evaluation:              Outcome Evaluation: Patient is a/o x4, cont b/b and up ad yuval. PT c/o palpitations around 0400. pt states she woke up from her sleep and felt her heart pounding and noticed HR at 112. No events of note on monitor. Pt didn't call out during the event, but afterwards and stated she felt better. All meds given as ordered. No other issues noted. See v/s and labs.

## 2023-06-15 ENCOUNTER — INPATIENT HOSPITAL (OUTPATIENT)
Age: 63
End: 2023-06-15
Payer: COMMERCIAL

## 2023-06-15 ENCOUNTER — ANESTHESIA EVENT (OUTPATIENT)
Dept: PERIOP | Facility: HOSPITAL | Age: 63
End: 2023-06-15
Payer: COMMERCIAL

## 2023-06-15 ENCOUNTER — ANESTHESIA (OUTPATIENT)
Dept: PERIOP | Facility: HOSPITAL | Age: 63
End: 2023-06-15
Payer: COMMERCIAL

## 2023-06-15 ENCOUNTER — INPATIENT HOSPITAL (OUTPATIENT)
Dept: URBAN - METROPOLITAN AREA HOSPITAL 113 | Facility: HOSPITAL | Age: 63
End: 2023-06-15
Payer: COMMERCIAL

## 2023-06-15 ENCOUNTER — APPOINTMENT (OUTPATIENT)
Dept: GENERAL RADIOLOGY | Facility: HOSPITAL | Age: 63
End: 2023-06-15
Payer: COMMERCIAL

## 2023-06-15 DIAGNOSIS — R07.9 CHEST PAIN, UNSPECIFIED: ICD-10-CM

## 2023-06-15 PROBLEM — K80.64 CALCULUS OF GALLBLADDER AND BILE DUCT WITH CHRONIC CHOLECYSTITIS WITHOUT OBSTRUCTION: Status: ACTIVE | Noted: 2023-06-14

## 2023-06-15 LAB
ALBUMIN SERPL-MCNC: 4 G/DL (ref 3.5–5.2)
ALBUMIN/GLOB SERPL: 1.4 G/DL
ALP SERPL-CCNC: 128 U/L (ref 39–117)
ALT SERPL W P-5'-P-CCNC: 382 U/L (ref 1–33)
ANION GAP SERPL CALCULATED.3IONS-SCNC: 13.6 MMOL/L (ref 5–15)
AST SERPL-CCNC: 64 U/L (ref 1–32)
BILIRUB SERPL-MCNC: 0.6 MG/DL (ref 0–1.2)
BUN SERPL-MCNC: 15 MG/DL (ref 8–23)
BUN/CREAT SERPL: 19.7 (ref 7–25)
CALCIUM SPEC-SCNC: 9.6 MG/DL (ref 8.6–10.5)
CHLORIDE SERPL-SCNC: 104 MMOL/L (ref 98–107)
CO2 SERPL-SCNC: 22.4 MMOL/L (ref 22–29)
CREAT SERPL-MCNC: 0.76 MG/DL (ref 0.57–1)
DEPRECATED RDW RBC AUTO: 42.7 FL (ref 37–54)
EGFRCR SERPLBLD CKD-EPI 2021: 87.6 ML/MIN/1.73
ERYTHROCYTE [DISTWIDTH] IN BLOOD BY AUTOMATED COUNT: 12.4 % (ref 12.3–15.4)
GLOBULIN UR ELPH-MCNC: 2.8 GM/DL
GLUCOSE SERPL-MCNC: 83 MG/DL (ref 65–99)
HCT VFR BLD AUTO: 39.9 % (ref 34–46.6)
HGB BLD-MCNC: 13.8 G/DL (ref 12–15.9)
MCH RBC QN AUTO: 32.3 PG (ref 26.6–33)
MCHC RBC AUTO-ENTMCNC: 34.6 G/DL (ref 31.5–35.7)
MCV RBC AUTO: 93.4 FL (ref 79–97)
PLATELET # BLD AUTO: 312 10*3/MM3 (ref 140–450)
PMV BLD AUTO: 9.5 FL (ref 6–12)
POTASSIUM SERPL-SCNC: 3.8 MMOL/L (ref 3.5–5.2)
PROT SERPL-MCNC: 6.8 G/DL (ref 6–8.5)
RBC # BLD AUTO: 4.27 10*6/MM3 (ref 3.77–5.28)
SODIUM SERPL-SCNC: 140 MMOL/L (ref 136–145)
WBC NRBC COR # BLD: 7.14 10*3/MM3 (ref 3.4–10.8)

## 2023-06-15 PROCEDURE — 25010000002 PROPOFOL 10 MG/ML EMULSION: Performed by: ANESTHESIOLOGY

## 2023-06-15 PROCEDURE — 25010000002 ONDANSETRON PER 1 MG: Performed by: ANESTHESIOLOGY

## 2023-06-15 PROCEDURE — 74300 X-RAY BILE DUCTS/PANCREAS: CPT

## 2023-06-15 PROCEDURE — 94799 UNLISTED PULMONARY SVC/PX: CPT

## 2023-06-15 PROCEDURE — 99214 OFFICE O/P EST MOD 30 MIN: CPT | Performed by: INTERNAL MEDICINE

## 2023-06-15 PROCEDURE — 85027 COMPLETE CBC AUTOMATED: CPT | Performed by: STUDENT IN AN ORGANIZED HEALTH CARE EDUCATION/TRAINING PROGRAM

## 2023-06-15 PROCEDURE — 99233 SBSQ HOSP IP/OBS HIGH 50: CPT | Performed by: INTERNAL MEDICINE

## 2023-06-15 PROCEDURE — 25010000002 SUGAMMADEX 200 MG/2ML SOLUTION: Performed by: ANESTHESIOLOGY

## 2023-06-15 PROCEDURE — 94761 N-INVAS EAR/PLS OXIMETRY MLT: CPT

## 2023-06-15 PROCEDURE — 80053 COMPREHEN METABOLIC PANEL: CPT | Performed by: STUDENT IN AN ORGANIZED HEALTH CARE EDUCATION/TRAINING PROGRAM

## 2023-06-15 PROCEDURE — 88304 TISSUE EXAM BY PATHOLOGIST: CPT | Performed by: SURGERY

## 2023-06-15 PROCEDURE — 25010000002 DEXAMETHASONE SODIUM PHOSPHATE 20 MG/5ML SOLUTION: Performed by: ANESTHESIOLOGY

## 2023-06-15 PROCEDURE — G0378 HOSPITAL OBSERVATION PER HR: HCPCS

## 2023-06-15 PROCEDURE — 25010000002 FENTANYL CITRATE (PF) 50 MCG/ML SOLUTION: Performed by: ANESTHESIOLOGY

## 2023-06-15 PROCEDURE — 94664 DEMO&/EVAL PT USE INHALER: CPT

## 2023-06-15 PROCEDURE — 25010000002 DROPERIDOL PER 5 MG: Performed by: ANESTHESIOLOGY

## 2023-06-15 DEVICE — LIGAMAX 5 MM ENDOSCOPIC MULTIPLE CLIP APPLIER
Type: IMPLANTABLE DEVICE | Site: ABDOMEN | Status: FUNCTIONAL
Brand: LIGAMAX

## 2023-06-15 RX ORDER — ONDANSETRON 2 MG/ML
INJECTION INTRAMUSCULAR; INTRAVENOUS AS NEEDED
Status: DISCONTINUED | OUTPATIENT
Start: 2023-06-15 | End: 2023-06-15 | Stop reason: SURG

## 2023-06-15 RX ORDER — HYDROCODONE BITARTRATE AND ACETAMINOPHEN 7.5; 325 MG/1; MG/1
1 TABLET ORAL ONCE AS NEEDED
Status: DISCONTINUED | OUTPATIENT
Start: 2023-06-15 | End: 2023-06-15 | Stop reason: HOSPADM

## 2023-06-15 RX ORDER — DROPERIDOL 2.5 MG/ML
0.62 INJECTION, SOLUTION INTRAMUSCULAR; INTRAVENOUS
Status: DISCONTINUED | OUTPATIENT
Start: 2023-06-15 | End: 2023-06-15 | Stop reason: HOSPADM

## 2023-06-15 RX ORDER — DEXAMETHASONE SODIUM PHOSPHATE 4 MG/ML
INJECTION, SOLUTION INTRA-ARTICULAR; INTRALESIONAL; INTRAMUSCULAR; INTRAVENOUS; SOFT TISSUE AS NEEDED
Status: DISCONTINUED | OUTPATIENT
Start: 2023-06-15 | End: 2023-06-15 | Stop reason: SURG

## 2023-06-15 RX ORDER — ENOXAPARIN SODIUM 100 MG/ML
40 INJECTION SUBCUTANEOUS EVERY 24 HOURS
Status: CANCELLED | OUTPATIENT
Start: 2023-06-17

## 2023-06-15 RX ORDER — MAGNESIUM HYDROXIDE 1200 MG/15ML
LIQUID ORAL AS NEEDED
Status: DISCONTINUED | OUTPATIENT
Start: 2023-06-15 | End: 2023-06-15 | Stop reason: HOSPADM

## 2023-06-15 RX ORDER — ONDANSETRON 2 MG/ML
4 INJECTION INTRAMUSCULAR; INTRAVENOUS ONCE AS NEEDED
Status: DISCONTINUED | OUTPATIENT
Start: 2023-06-15 | End: 2023-06-15 | Stop reason: HOSPADM

## 2023-06-15 RX ORDER — NALOXONE HCL 0.4 MG/ML
0.2 VIAL (ML) INJECTION AS NEEDED
Status: DISCONTINUED | OUTPATIENT
Start: 2023-06-15 | End: 2023-06-15 | Stop reason: HOSPADM

## 2023-06-15 RX ORDER — LIDOCAINE HYDROCHLORIDE 10 MG/ML
0.5 INJECTION, SOLUTION EPIDURAL; INFILTRATION; INTRACAUDAL; PERINEURAL ONCE AS NEEDED
Status: DISCONTINUED | OUTPATIENT
Start: 2023-06-15 | End: 2023-06-15 | Stop reason: HOSPADM

## 2023-06-15 RX ORDER — SODIUM CHLORIDE 0.9 % (FLUSH) 0.9 %
3-10 SYRINGE (ML) INJECTION AS NEEDED
Status: DISCONTINUED | OUTPATIENT
Start: 2023-06-15 | End: 2023-06-15 | Stop reason: HOSPADM

## 2023-06-15 RX ORDER — PROPOFOL 10 MG/ML
VIAL (ML) INTRAVENOUS AS NEEDED
Status: DISCONTINUED | OUTPATIENT
Start: 2023-06-15 | End: 2023-06-15 | Stop reason: SURG

## 2023-06-15 RX ORDER — FAMOTIDINE 10 MG/ML
20 INJECTION, SOLUTION INTRAVENOUS ONCE
Status: COMPLETED | OUTPATIENT
Start: 2023-06-15 | End: 2023-06-15

## 2023-06-15 RX ORDER — FLUMAZENIL 0.1 MG/ML
0.2 INJECTION INTRAVENOUS AS NEEDED
Status: DISCONTINUED | OUTPATIENT
Start: 2023-06-15 | End: 2023-06-15 | Stop reason: HOSPADM

## 2023-06-15 RX ORDER — ROCURONIUM BROMIDE 10 MG/ML
INJECTION, SOLUTION INTRAVENOUS AS NEEDED
Status: DISCONTINUED | OUTPATIENT
Start: 2023-06-15 | End: 2023-06-15 | Stop reason: SURG

## 2023-06-15 RX ORDER — DIPHENHYDRAMINE HYDROCHLORIDE 50 MG/ML
12.5 INJECTION INTRAMUSCULAR; INTRAVENOUS
Status: DISCONTINUED | OUTPATIENT
Start: 2023-06-15 | End: 2023-06-15 | Stop reason: HOSPADM

## 2023-06-15 RX ORDER — IPRATROPIUM BROMIDE AND ALBUTEROL SULFATE 2.5; .5 MG/3ML; MG/3ML
3 SOLUTION RESPIRATORY (INHALATION) ONCE AS NEEDED
Status: DISCONTINUED | OUTPATIENT
Start: 2023-06-15 | End: 2023-06-15 | Stop reason: HOSPADM

## 2023-06-15 RX ORDER — FENTANYL CITRATE 50 UG/ML
50 INJECTION, SOLUTION INTRAMUSCULAR; INTRAVENOUS
Status: DISCONTINUED | OUTPATIENT
Start: 2023-06-15 | End: 2023-06-15 | Stop reason: HOSPADM

## 2023-06-15 RX ORDER — CLINDAMYCIN PHOSPHATE 900 MG/50ML
900 INJECTION INTRAVENOUS ONCE
Status: COMPLETED | OUTPATIENT
Start: 2023-06-15 | End: 2023-06-15

## 2023-06-15 RX ORDER — PROMETHAZINE HYDROCHLORIDE 25 MG/1
25 SUPPOSITORY RECTAL ONCE AS NEEDED
Status: DISCONTINUED | OUTPATIENT
Start: 2023-06-15 | End: 2023-06-15 | Stop reason: HOSPADM

## 2023-06-15 RX ORDER — BUPIVACAINE HYDROCHLORIDE AND EPINEPHRINE 5; 5 MG/ML; UG/ML
INJECTION, SOLUTION EPIDURAL; INTRACAUDAL; PERINEURAL AS NEEDED
Status: DISCONTINUED | OUTPATIENT
Start: 2023-06-15 | End: 2023-06-15 | Stop reason: HOSPADM

## 2023-06-15 RX ORDER — OXYCODONE HYDROCHLORIDE AND ACETAMINOPHEN 5; 325 MG/1; MG/1
1 TABLET ORAL EVERY 4 HOURS PRN
Status: DISCONTINUED | OUTPATIENT
Start: 2023-06-15 | End: 2023-06-16 | Stop reason: HOSPADM

## 2023-06-15 RX ORDER — SODIUM CHLORIDE, SODIUM LACTATE, POTASSIUM CHLORIDE, CALCIUM CHLORIDE 600; 310; 30; 20 MG/100ML; MG/100ML; MG/100ML; MG/100ML
9 INJECTION, SOLUTION INTRAVENOUS CONTINUOUS
Status: DISCONTINUED | OUTPATIENT
Start: 2023-06-15 | End: 2023-06-15

## 2023-06-15 RX ORDER — PROMETHAZINE HYDROCHLORIDE 12.5 MG/1
12.5 TABLET ORAL ONCE AS NEEDED
Status: DISCONTINUED | OUTPATIENT
Start: 2023-06-15 | End: 2023-06-15 | Stop reason: HOSPADM

## 2023-06-15 RX ORDER — LIDOCAINE HYDROCHLORIDE 20 MG/ML
INJECTION, SOLUTION INFILTRATION; PERINEURAL AS NEEDED
Status: DISCONTINUED | OUTPATIENT
Start: 2023-06-15 | End: 2023-06-15 | Stop reason: SURG

## 2023-06-15 RX ORDER — OXYCODONE AND ACETAMINOPHEN 7.5; 325 MG/1; MG/1
1 TABLET ORAL EVERY 4 HOURS PRN
Status: DISCONTINUED | OUTPATIENT
Start: 2023-06-15 | End: 2023-06-15 | Stop reason: HOSPADM

## 2023-06-15 RX ORDER — SODIUM CHLORIDE 0.9 % (FLUSH) 0.9 %
3 SYRINGE (ML) INJECTION EVERY 12 HOURS SCHEDULED
Status: DISCONTINUED | OUTPATIENT
Start: 2023-06-15 | End: 2023-06-15 | Stop reason: HOSPADM

## 2023-06-15 RX ORDER — FENTANYL CITRATE 50 UG/ML
INJECTION, SOLUTION INTRAMUSCULAR; INTRAVENOUS AS NEEDED
Status: DISCONTINUED | OUTPATIENT
Start: 2023-06-15 | End: 2023-06-15 | Stop reason: SURG

## 2023-06-15 RX ORDER — ENOXAPARIN SODIUM 100 MG/ML
40 INJECTION SUBCUTANEOUS EVERY 24 HOURS
Status: DISCONTINUED | OUTPATIENT
Start: 2023-06-16 | End: 2023-06-15

## 2023-06-15 RX ORDER — LABETALOL HYDROCHLORIDE 5 MG/ML
5 INJECTION, SOLUTION INTRAVENOUS
Status: DISCONTINUED | OUTPATIENT
Start: 2023-06-15 | End: 2023-06-15 | Stop reason: HOSPADM

## 2023-06-15 RX ORDER — HYDRALAZINE HYDROCHLORIDE 20 MG/ML
5 INJECTION INTRAMUSCULAR; INTRAVENOUS
Status: DISCONTINUED | OUTPATIENT
Start: 2023-06-15 | End: 2023-06-15 | Stop reason: HOSPADM

## 2023-06-15 RX ORDER — PROMETHAZINE HYDROCHLORIDE 25 MG/1
25 TABLET ORAL ONCE AS NEEDED
Status: DISCONTINUED | OUTPATIENT
Start: 2023-06-15 | End: 2023-06-15 | Stop reason: HOSPADM

## 2023-06-15 RX ORDER — HYDROMORPHONE HYDROCHLORIDE 1 MG/ML
0.5 INJECTION, SOLUTION INTRAMUSCULAR; INTRAVENOUS; SUBCUTANEOUS
Status: DISCONTINUED | OUTPATIENT
Start: 2023-06-15 | End: 2023-06-15 | Stop reason: HOSPADM

## 2023-06-15 RX ORDER — SODIUM CHLORIDE 9 MG/ML
INJECTION, SOLUTION INTRAVENOUS AS NEEDED
Status: DISCONTINUED | OUTPATIENT
Start: 2023-06-15 | End: 2023-06-15 | Stop reason: HOSPADM

## 2023-06-15 RX ORDER — MIDAZOLAM HYDROCHLORIDE 1 MG/ML
1 INJECTION INTRAMUSCULAR; INTRAVENOUS
Status: DISCONTINUED | OUTPATIENT
Start: 2023-06-15 | End: 2023-06-15 | Stop reason: HOSPADM

## 2023-06-15 RX ORDER — ACETAMINOPHEN 500 MG
1000 TABLET ORAL ONCE
Status: COMPLETED | OUTPATIENT
Start: 2023-06-15 | End: 2023-06-15

## 2023-06-15 RX ADMIN — PROPOFOL 150 MG: 10 INJECTION, EMULSION INTRAVENOUS at 14:05

## 2023-06-15 RX ADMIN — OXYCODONE HYDROCHLORIDE AND ACETAMINOPHEN 1 TABLET: 5; 325 TABLET ORAL at 16:52

## 2023-06-15 RX ADMIN — SODIUM CHLORIDE, POTASSIUM CHLORIDE, SODIUM LACTATE AND CALCIUM CHLORIDE 9 ML/HR: 600; 310; 30; 20 INJECTION, SOLUTION INTRAVENOUS at 12:39

## 2023-06-15 RX ADMIN — BUDESONIDE AND FORMOTEROL FUMARATE DIHYDRATE 2 PUFF: 160; 4.5 AEROSOL RESPIRATORY (INHALATION) at 20:04

## 2023-06-15 RX ADMIN — DEXAMETHASONE SODIUM PHOSPHATE 8 MG: 4 INJECTION, SOLUTION INTRAMUSCULAR; INTRAVENOUS at 14:31

## 2023-06-15 RX ADMIN — NORTRIPTYLINE HYDROCHLORIDE 25 MG: 25 CAPSULE ORAL at 20:01

## 2023-06-15 RX ADMIN — ACETAMINOPHEN 1000 MG: 500 TABLET ORAL at 12:38

## 2023-06-15 RX ADMIN — CLINDAMYCIN IN 5 PERCENT DEXTROSE 900 MG: 18 INJECTION, SOLUTION INTRAVENOUS at 13:53

## 2023-06-15 RX ADMIN — FENTANYL CITRATE 50 MCG: 50 INJECTION, SOLUTION INTRAMUSCULAR; INTRAVENOUS at 15:11

## 2023-06-15 RX ADMIN — SUGAMMADEX 200 MG: 100 INJECTION, SOLUTION INTRAVENOUS at 14:45

## 2023-06-15 RX ADMIN — ROCURONIUM BROMIDE 40 MG: 10 INJECTION, SOLUTION INTRAVENOUS at 14:05

## 2023-06-15 RX ADMIN — BUDESONIDE AND FORMOTEROL FUMARATE DIHYDRATE 2 PUFF: 160; 4.5 AEROSOL RESPIRATORY (INHALATION) at 06:26

## 2023-06-15 RX ADMIN — LIDOCAINE HYDROCHLORIDE 80 MG: 20 INJECTION, SOLUTION INFILTRATION; PERINEURAL at 14:04

## 2023-06-15 RX ADMIN — DROPERIDOL 0.62 MG: 2.5 INJECTION, SOLUTION INTRAMUSCULAR; INTRAVENOUS at 15:25

## 2023-06-15 RX ADMIN — FENTANYL CITRATE 50 MCG: 50 INJECTION, SOLUTION INTRAMUSCULAR; INTRAVENOUS at 14:30

## 2023-06-15 RX ADMIN — ONDANSETRON 4 MG: 2 INJECTION INTRAMUSCULAR; INTRAVENOUS at 14:41

## 2023-06-15 RX ADMIN — FAMOTIDINE 20 MG: 10 INJECTION INTRAVENOUS at 12:39

## 2023-06-15 RX ADMIN — PANCRELIPASE 36000 UNITS OF LIPASE: 60000; 12000; 38000 CAPSULE, DELAYED RELEASE PELLETS ORAL at 17:31

## 2023-06-15 NOTE — OP NOTE
Operative Note :  Karuna Juárez MD    Nani Matias  1960    Procedure Date: 06/15/23    Pre-op Diagnosis:  Calculus of gallbladder with chronic cholecystitis without obstruction [K80.10]  Elevated LFTs [R79.89]    Post-op Diagnosis:  Calculus of gallbladder with chronic cholecystitis without obstruction [K80.10]  Elevated LFTs [R79.89]    Procedure:   Laparoscopic cholecystectomy with cholangiogram    Surgeon: Karuna Juárez MD    Assistant: Rebecca Spicer CSA (Rebecca was responsible for laparoscopic manipulation of the gallbladder during critical portions of the dissection, handling of the laparoscopic camera, closure of all surgical incisions, and application of topical sterile dressings)    Anesthesia:  General (general endotracheal tube)    Estimated Blood Loss: minimal    Specimens:  Gallbladder    Complications: None    Indications: The patient is a 62 year-old lady who was admitted the other day with epigastric abdominal pain and elevated LFTs.  Work-up revealed cholelithiasis.  I have recommended proceeding to the operating for laparoscopic cholecystectomy with cholangiogram given risk for choledocholithiasis.  She understands the risks of the procedure and has consented to proceed.    Findings: Nonobstructive choledocholithiasis    Description of procedure:  The patient was brought to the operating room and placed on the OR table in supine position.  An endotracheal tube was inserted, and general anesthesia was induced.  The anterior abdominal wall was shaved, prepped, and draped in a sterile fashion.  A surgical timeout was then completed.  A curvilinear infraumbilical skin incision was made after instillation of 0.5% Marcaine with epinephrine.  The umbilical stalk was grasped and elevated, and a longitudinal fasciotomy made.  A Farah trocar was inserted and secured with 2 separate 0 Vicryl stay sutures.  The abdomen was then insufflated.  Under direct visualization, 3 additional 5 mm  trocars were then placed within the subxiphoid and subcostal space on the right.  The gallbladder was retracted cephalad and infundibulum retracted inferiorly. The cystic duct and cystic artery were slowly dissected out circumferentially until a firm critical view was obtained.  A single Hemoclip was placed across the cystic duct at its junction with the gallbladder infundibulum and 3 hemoclips were placed across the cystic artery.  A small hole was created on the anterior wall of the cystic duct, and a cholangiogram catheter inserted through a right upper quadrant angiocatheter.  The catheter was secured within the duct with an additional Hemoclip and a cholangiogram was then obtained.  The cholangiogram showed filling of the cystic duct and common bile duct, retrograde filling of the intrahepatic ducts, and initially there was no filling of the duodenum due to a distal common bile duct stone.  However with further injection of contrast, there was some filling of the duodenum around the distal common duct stone.  The catheter was then withdrawn and 2 additional hemoclips placed across the cystic duct.  The cystic duct and artery were then transected, leaving 2 clips behind on both of the stumps.  The gallbladder was then slowly dissected off of the undersurface of the liver using electrocautery and it was removed from the peritoneal cavity within an Endo Catch bag at the umbilical trocar site.  The gallbladder was passed off to pathology.  The abdomen was then reinsufflated and right upper quadrant inspected. Warm normal saline was irrigated and suctioned dry.  The gallbladder fossa appeared hemostatic.  All trocars were then removed under direct visualization and the abdomen desufflated.  The umbilical fascial incision was closed using a new 0 Vicryl figure-of-eight suture, all skin incisions closed with 4-0 Vicryl subcuticular stitches, and then each was dressed with Exofin glue.  The patient was then extubated  and transferred to PACU in stable condition.  All counts were correct per nursing.    Recommendations  Consult gastroenterology for ERCP.  She can have a regular diet today but should remain n.p.o. at midnight tonight.    Karuna Juárez MD  General, Robotic, and Endoscopic Surgery  [facility]    [address], [address]  [place] [address]  P: [phone]  F: [phone]

## 2023-06-15 NOTE — PROGRESS NOTES
[facility]     Progress Note    Patient Name: Nani Matias  : 1960  MRN: [id number]  Primary Care Physician:  Robyn Morris MD  Date of admission: 2023    Subjective   Subjective     Chief Complaint:  pain , increased liver function tests  History of Present Illness  Pain today, feels better.   No black or bloody stools     Review of Systems   Neurological:  Positive for weakness.     Objective   Objective     Vitals:   Temp:  [97.4 °F (36.3 °C)-98.4 °F (36.9 °C)] 97.9 °F (36.6 °C)  Heart Rate:  [] 80  Resp:  [18] 18  BP: ()/(64-82) 94/82    Physical Exam  HENT:      Right Ear: External ear normal.      Left Ear: External ear normal.   Eyes:      Conjunctiva/sclera: Conjunctivae normal.   Cardiovascular:      Rate and Rhythm: Normal rate.   Pulmonary:      Effort: Pulmonary effort is normal.   Abdominal:      General: Abdomen is flat.      Tenderness: There is abdominal tenderness in the right upper quadrant and epigastric area. There is no guarding or rebound.      Comments:  Less tender than yesterday   Neurological:      Mental Status: She is alert.        Result Review    Result Review:  I have personally reviewed the results from the time of this admission to 6/15/2023 07:41 EDT and agree with these findings:  []  Laboratory list / accordion  []  Microbiology  []  Radiology  []  EKG/Telemetry   []  Cardiology/Vascular   []  Pathology  []  Old records  []  Other:  Most notable findings include:       Assessment & Plan   Assessment / Plan     Brief Patient Summary:  Nani Matias is a 62 y.o. female who   Gallstones  Increase liver function tests, pattern concerning for possible passed gallstone  Irritable bowel syndrome  Active Hospital Problems:  Active Hospital Problems    Diagnosis     **Chest pain     Epigastric pain     IBS (irritable bowel syndrome)     COPD (chronic obstructive pulmonary disease)     Hyperlipidemia     OZ (obstructive sleep apnea)      Cholelithiasis     Elevated LFTs      Plan: appreciate surgery input and plans.  Certainly a choly is reasonable at this time , ultimately up to surgery will follow.       DVT prophylaxis:  Medical DVT prophylaxis orders are present.    CODE STATUS:    Code Status (Patient has no pulse and is not breathing): CPR (Attempt to Resuscitate)  Medical Interventions (Patient has pulse or is breathing): Full    Disposition:  I expect patient to be discharged uncertain.    Patrick Simmons MD

## 2023-06-15 NOTE — PROGRESS NOTES
"General Surgery  Progress Note    CC: Follow-up cholelithiasis, elevated LFTs    S: Denies any abdominal pain but does endorse some nausea without vomiting this morning.  Her LFTs are improving.  Gallbladder ultrasound yesterday showed a multitude of gallstones but no evidence of cholecystitis.    ROS: Positive for nausea; denies abdominal pain, vomiting, melena, hematochezia, jaundice, or scleral icterus    O:BP 94/82 (BP Location: Left arm, Patient Position: Sitting)   Pulse 80   Temp 97.9 °F (36.6 °C) (Oral)   Resp 18   Ht 163 cm (64.17\")   Wt 69.6 kg (153 lb 6.4 oz)   SpO2 94%   BMI 26.19 kg/m²     GENERAL: alert, well appearing, and in no distress  HEENT: normocephalic, atraumatic, moist mucous membranes, clear sclerae   CHEST: clear to auscultation, no wheezes, rales or rhonchi, symmetric air entry  CARDIAC: regular rate and rhythm    ABDOMEN: Soft, nontender, nondistended  EXTREMITIES: no cyanosis, clubbing, or edema   SKIN: Warm and moist, no rashes    LABS  Results from last 7 days   Lab Units 06/15/23  0346 06/14/23  0320 06/13/23  0908   WBC 10*3/mm3 7.14 7.83  --    HEMOGLOBIN g/dL 13.8 13.7  --    HEMATOCRIT % 39.9 40.3  --    PLATELETS 10*3/mm3 312 329  --    MONOCYTES % %  --   --  6.4     Results from last 7 days   Lab Units 06/15/23  0346 06/14/23  0320 06/13/23  1907   SODIUM mmol/L 140 138  --    POTASSIUM mmol/L 3.8 3.7  --    CHLORIDE mmol/L 104 102  --    CO2 mmol/L 22.4 22.6  --    BUN mg/dL 15 13  --    CREATININE mg/dL 0.76 0.86 0.75   CALCIUM mg/dL 9.6 9.6  --    BILIRUBIN mg/dL 0.6 0.7  --    ALK PHOS U/L 128* 152*  --    ALT (SGPT) U/L 382* 574*  --    AST (SGOT) U/L 64* 136*  --    GLUCOSE mg/dL 83 83  --              A/P: 62 y.o. female with cholelithiasis and elevated LFTs concerning for choledocholithiasis    I reviewed her gallbladder ultrasound done yesterday and discussed the results with the patient and her .  I would still recommend proceeding with laparoscopic " cholecystectomy and cholangiogram today as I am concerned she may have active choledocholithiasis or may have recently passed a stone through the bile duct.  I discussed with her the risks of the surgery which include bleeding, wound infection, common bile duct injury, and postoperative bile leak.  She also understands if the cholangiogram demonstrates choledocholithiasis, she will require ERCP tomorrow.  Despite the risks of surgery, she and her  have consented to proceed and are in agreement with this plan.  She should remain n.p.o. until after surgery today.  If there is no evidence for choledocholithiasis, she can likely be discharged home tonight.    Karuna Juárez MD  General, Robotic, and Endoscopic Surgery  [facility]    [address], [address]  [place] [address]  P: [phone]  F: [phone]

## 2023-06-15 NOTE — ANESTHESIA PROCEDURE NOTES
Airway  Urgency: elective    Date/Time: 6/15/2023 2:09 PM  Airway not difficult    General Information and Staff    Patient location during procedure: OR  Anesthesiologist: Nikolas Jarrell MD    Indications and Patient Condition  Indications for airway management: airway protection    Preoxygenated: yes  Mask difficulty assessment: 1 - vent by mask    Final Airway Details  Final airway type: endotracheal airway      Successful airway: ETT  Cuffed: yes   Successful intubation technique: direct laryngoscopy  Facilitating devices/methods: intubating stylet  Endotracheal tube insertion site: oral  Blade: Teodoro  Blade size: 4  ETT size (mm): 7.0  Cormack-Lehane Classification: grade IIb - view of arytenoids or posterior of glottis only  Placement verified by: chest auscultation   Measured from: lips  ETT/EBT  to lips (cm): 21  Number of attempts at approach: 1  Assessment: lips, teeth, and gum same as pre-op and atraumatic intubation

## 2023-06-15 NOTE — PROGRESS NOTES
Name: Nani Matias ADMIT: 2023   : 1960  PCP: Robyn Morris MD    MRN: [id number] LOS: 1 days   AGE/SEX: 62 y.o. female  ROOM: [facility] OR/MAIN OR     Subjective   Subjective     I saw the patient this morning prior to surgery, hoping that we might be able to discharge her later today if her surgery went well. The surgery appears to have gone well, but the intraoperative cholangiogram was positive for a stone at the distal common bile duct       Objective   Objective   Vital Signs  Temp:  [97.6 °F (36.4 °C)-98.9 °F (37.2 °C)] 97.6 °F (36.4 °C)  Heart Rate:  [64-99] 67  Resp:  [14-21] 17  BP: ()/(56-82) 117/56  SpO2:  [92 %-100 %] 100 %  on  Flow (L/min):  [2] 2;   Device (Oxygen Therapy): room air;nasal cannula  Body mass index is 26.19 kg/m².  Physical Exam  Constitutional:       General: She is not in acute distress.     Appearance: She is not toxic-appearing.   Cardiovascular:      Rate and Rhythm: Normal rate and regular rhythm.      Heart sounds: Normal heart sounds.   Pulmonary:      Effort: Pulmonary effort is normal.      Breath sounds: Normal breath sounds.   Abdominal:      General: Bowel sounds are normal.      Palpations: Abdomen is soft.   Musculoskeletal:         General: No tenderness.      Right lower leg: No edema.      Left lower leg: No edema.   Neurological:      Mental Status: She is alert.   Psychiatric:         Mood and Affect: Mood normal.         Behavior: Behavior normal.       Results Review     I reviewed the patient's new clinical results.  Results from last 7 days   Lab Units 06/15/23  0346 23  0320   WBC 10*3/mm3 7.14 7.83   HEMOGLOBIN g/dL 13.8 13.7   PLATELETS 10*3/mm3 312 329       Results from last 7 days   Lab Units 06/15/23  0346 23  0320 23  1907   SODIUM mmol/L 140 138  --    POTASSIUM mmol/L 3.8 3.7  --    CHLORIDE mmol/L 104 102  --    CO2 mmol/L 22.4 22.6  --    BUN mg/dL 15 13  --    CREATININE mg/dL 0.76 0.86 0.75   GLUCOSE  mg/dL 83 83  --      Estimated Creatinine Clearance: 71.9 mL/min (by C-G formula based on SCr of 0.76 mg/dL).  Results from last 7 days   Lab Units 06/15/23  0346 06/14/23  0320   ALBUMIN g/dL 4.0 4.2   BILIRUBIN mg/dL 0.6 0.7   ALK PHOS U/L 128* 152*   AST (SGOT) U/L 64* 136*   ALT (SGPT) U/L 382* 574*       Results from last 7 days   Lab Units 06/15/23  0346 06/14/23  0320   CALCIUM mg/dL 9.6 9.6   ALBUMIN g/dL 4.0 4.2       No results found for: COVID19  No results found for: HGBA1C, POCGLU    FL Cholangiogram Operative  Narrative: FL CHOLANGIOGRAM OPERATIVE-     INDICATIONS: Operative cholangiography     TECHNIQUE: FLUOROSCOPIC ASSISTANCE IN THE OPERATING ROOM.     FINDINGS:     97 intraoperative fluoroscopic spot views were obtained and recorded the  PACS for review, revealing opacification of cystic duct remnant,  extrahepatic and central intrahepatic biliary ducts.. No biliary  obstruction. A persistent appearance of filling defect in the distal  common biliary duct raises suspicion for choledocholithiasis, MRCP  correlation can be obtained as indicated. Please see operative report  for full details.     Fluoroscopy time: 16.7 seconds     Radiation exposure: 4.5 mgy        Impression:    As described.     This report was finalized on 6/15/2023 3:53 PM by Dr. Leopoldo Quick M.D.       Scheduled Medications  [MAR Hold] atorvastatin, 20 mg, Oral, Daily  [MAR Hold] budesonide-formoterol, 2 puff, Inhalation, BID - RT  [MAR Hold] enoxaparin, 40 mg, Subcutaneous, Q24H  [MAR Hold] nortriptyline, 25 mg, Oral, Nightly  [MAR Hold] pancrelipase (Lip-Prot-Amyl), 36,000 units of lipase, Oral, TID With Meals  [MAR Hold] senna-docusate sodium, 2 tablet, Oral, BID    Infusions  lactated ringers, 9 mL/hr, Last Rate: 9 mL/hr (06/15/23 1400)    Diet  NPO Diet NPO Type: Strict NPO       Assessment/Plan     Active Hospital Problems    Diagnosis  POA    **Calculus of gallbladder and bile duct with chronic cholecystitis without  obstruction [K80.64]  Yes    Epigastric pain [R10.13]  Yes    IBS (irritable bowel syndrome) [K58.9]  Yes    COPD (chronic obstructive pulmonary disease) [J44.9]  Yes    Hyperlipidemia [E78.5]  Yes    OZ (obstructive sleep apnea) [G47.33]  Yes    Elevated LFTs [R79.89]  Yes    Chest pain [R07.9]  Yes      Resolved Hospital Problems   No resolved problems to display.       62 y.o. female admitted with Calculus of gallbladder and bile duct with chronic cholecystitis without obstruction.    Choledocholithiasis-s/p laparoscopic cholecystectomy today with positive IOC with a distal common bile duct stone. ERCP timing per gastroenterology.  Chest discomfort with palpitations-atypical description. No ishcemic changes on EKG. Troponins were negative x2. echocardiogram was unremarkable. Cardiology recommends a ziopatch at discharge  COPD-symbicort  Hyperlipidemia-atorvastatin  IBS-nortriptyline   OZ-cpap if available  SCDs for DVT prophylaxis.  Full code.  Discussed with patient, spouse, and nursing staff.  Anticipate discharge home timing yet to be determined.      Beau Alicea MD  [facility]  06/15/23  16:25 EDT    I wore protective equipment throughout this patient encounter including a face mask, gloves and protective eyewear.  Hand hygiene was performed before donning protective equipment and after removal when leaving the room.

## 2023-06-15 NOTE — ANESTHESIA POSTPROCEDURE EVALUATION
"Patient: Nani Matias    Procedure Summary       Date: 06/15/23 Room / Location: [facility] OR [address] [place] [address] [facility]    Anesthesia Start: 1400 Anesthesia Stop: 1459    Procedure: Laparoscopic cholecystectomy with cholangiogram (Abdomen) Diagnosis:       Calculus of gallbladder with chronic cholecystitis without obstruction      Elevated LFTs      (Calculus of gallbladder with chronic cholecystitis without obstruction [K80.10])      (Elevated LFTs [R79.89])    Surgeons: Karuna Juárez MD Provider: Nikolas Jarrell MD    Anesthesia Type: general ASA Status: 3            Anesthesia Type: general    Vitals  Vitals Value Taken Time   /63 06/15/23 1600   Temp 36.5 °C (97.7 °F) 06/15/23 1500   Pulse 67 06/15/23 1609   Resp 15 06/15/23 1600   SpO2 100 % 06/15/23 1609   Vitals shown include unvalidated device data.        Post Anesthesia Care and Evaluation    Patient location during evaluation: PACU  Patient participation: complete - patient participated  Level of consciousness: awake and alert  Pain management: adequate    Airway patency: patent  Anesthetic complications: No anesthetic complications  PONV Status: controlled  Cardiovascular status: acceptable and hemodynamically stable  Respiratory status: acceptable  Hydration status: acceptable    Comments: /63   Pulse 64   Temp 36.5 °C (97.7 °F) (Oral)   Resp 15   Ht 163 cm (64.17\")   Wt 69.6 kg (153 lb 6.4 oz)   SpO2 100%   BMI 26.19 kg/m²     "

## 2023-06-15 NOTE — ANESTHESIA PREPROCEDURE EVALUATION
Anesthesia Evaluation     no history of anesthetic complications:   NPO Solid Status: > 8 hours  NPO Liquid Status: > 2 hours           Airway   Mallampati: II  no difficulty expected  Dental - normal exam     Pulmonary - normal exam   (+) a smoker Former, COPD,sleep apnea on CPAP  (-) asthma    PE comment: nonlabored  Cardiovascular - normal exam  Exercise tolerance: good (4-7 METS)    Rhythm: regular  Rate: normal    (+) dysrhythmias Tachycardia, hyperlipidemia  (-) hypertension, valvular problems/murmurs, past MI, CAD, angina    ROS comment: Echo 6/13/23:  ·  Left ventricular systolic function is normal. Calculated left ventricular EF = 62.4%  ·  Left ventricular diastolic function was normal.  ·  Estimated right ventricular systolic pressure from tricuspid regurgitation is normal (<35 mmHg).         Neuro/Psych- negative ROS  (-) seizures, TIA, CVA  GI/Hepatic/Renal/Endo    (+) liver disease history of elevated LFT  (-) GERD, no renal disease, diabetes, no thyroid disorder    ROS Comment: Gallstones    Musculoskeletal (-) negative ROS    Abdominal    Substance History      OB/GYN          Other                        Anesthesia Plan    ASA 3     general     intravenous induction     Anesthetic plan, risks, benefits, and alternatives have been provided, discussed and informed consent has been obtained with: patient.    CODE STATUS:    Code Status (Patient has no pulse and is not breathing): CPR (Attempt to Resuscitate)  Medical Interventions (Patient has pulse or is breathing): Full

## 2023-06-16 ENCOUNTER — ANESTHESIA (OUTPATIENT)
Dept: GASTROENTEROLOGY | Facility: HOSPITAL | Age: 63
End: 2023-06-16
Payer: COMMERCIAL

## 2023-06-16 ENCOUNTER — ANESTHESIA EVENT (OUTPATIENT)
Dept: GASTROENTEROLOGY | Facility: HOSPITAL | Age: 63
End: 2023-06-16
Payer: COMMERCIAL

## 2023-06-16 ENCOUNTER — APPOINTMENT (OUTPATIENT)
Dept: CARDIOLOGY | Facility: HOSPITAL | Age: 63
End: 2023-06-16
Payer: COMMERCIAL

## 2023-06-16 ENCOUNTER — APPOINTMENT (OUTPATIENT)
Dept: GENERAL RADIOLOGY | Facility: HOSPITAL | Age: 63
End: 2023-06-16
Payer: COMMERCIAL

## 2023-06-16 VITALS
BODY MASS INDEX: 26.38 KG/M2 | OXYGEN SATURATION: 95 % | HEART RATE: 73 BPM | TEMPERATURE: 97.7 F | SYSTOLIC BLOOD PRESSURE: 111 MMHG | DIASTOLIC BLOOD PRESSURE: 69 MMHG | WEIGHT: 154.5 LBS | RESPIRATION RATE: 18 BRPM | HEIGHT: 64 IN

## 2023-06-16 PROBLEM — K80.50 CHOLEDOCHOLITHIASIS: Status: ACTIVE | Noted: 2023-06-16

## 2023-06-16 PROBLEM — K80.50 CHOLEDOCHOLITHIASIS: Status: RESOLVED | Noted: 2023-06-16 | Resolved: 2023-06-16

## 2023-06-16 PROBLEM — R10.13 EPIGASTRIC PAIN: Status: RESOLVED | Noted: 2023-06-14 | Resolved: 2023-06-16

## 2023-06-16 PROBLEM — K80.64 CALCULUS OF GALLBLADDER AND BILE DUCT WITH CHRONIC CHOLECYSTITIS WITHOUT OBSTRUCTION: Status: RESOLVED | Noted: 2023-06-14 | Resolved: 2023-06-16

## 2023-06-16 PROBLEM — R07.9 CHEST PAIN: Status: RESOLVED | Noted: 2023-06-13 | Resolved: 2023-06-16

## 2023-06-16 PROBLEM — R79.89 ELEVATED LFTS: Status: RESOLVED | Noted: 2023-06-14 | Resolved: 2023-06-16

## 2023-06-16 LAB
ALBUMIN SERPL-MCNC: 4 G/DL (ref 3.5–5.2)
ALBUMIN/GLOB SERPL: 1.5 G/DL
ALP SERPL-CCNC: 110 U/L (ref 39–117)
ALT SERPL W P-5'-P-CCNC: 293 U/L (ref 1–33)
ANION GAP SERPL CALCULATED.3IONS-SCNC: 14.1 MMOL/L (ref 5–15)
AST SERPL-CCNC: 66 U/L (ref 1–32)
BASOPHILS # BLD AUTO: 0.01 10*3/MM3 (ref 0–0.2)
BASOPHILS NFR BLD AUTO: 0.1 % (ref 0–1.5)
BILIRUB SERPL-MCNC: 0.4 MG/DL (ref 0–1.2)
BUN SERPL-MCNC: 15 MG/DL (ref 8–23)
BUN/CREAT SERPL: 25 (ref 7–25)
CALCIUM SPEC-SCNC: 9.4 MG/DL (ref 8.6–10.5)
CHLORIDE SERPL-SCNC: 102 MMOL/L (ref 98–107)
CO2 SERPL-SCNC: 21.9 MMOL/L (ref 22–29)
CREAT SERPL-MCNC: 0.6 MG/DL (ref 0.57–1)
DEPRECATED RDW RBC AUTO: 43.1 FL (ref 37–54)
EGFRCR SERPLBLD CKD-EPI 2021: 100.4 ML/MIN/1.73
EOSINOPHIL # BLD AUTO: 0 10*3/MM3 (ref 0–0.4)
EOSINOPHIL NFR BLD AUTO: 0 % (ref 0.3–6.2)
ERYTHROCYTE [DISTWIDTH] IN BLOOD BY AUTOMATED COUNT: 12.4 % (ref 12.3–15.4)
GLOBULIN UR ELPH-MCNC: 2.6 GM/DL
GLUCOSE SERPL-MCNC: 115 MG/DL (ref 65–99)
HCT VFR BLD AUTO: 37 % (ref 34–46.6)
HGB BLD-MCNC: 12.6 G/DL (ref 12–15.9)
IMM GRANULOCYTES # BLD AUTO: 0.03 10*3/MM3 (ref 0–0.05)
IMM GRANULOCYTES NFR BLD AUTO: 0.4 % (ref 0–0.5)
LAB AP CASE REPORT: NORMAL
LYMPHOCYTES # BLD AUTO: 0.75 10*3/MM3 (ref 0.7–3.1)
LYMPHOCYTES NFR BLD AUTO: 9.4 % (ref 19.6–45.3)
MAXIMAL PREDICTED HEART RATE: 156 BPM
MCH RBC QN AUTO: 32.3 PG (ref 26.6–33)
MCHC RBC AUTO-ENTMCNC: 34.1 G/DL (ref 31.5–35.7)
MCV RBC AUTO: 94.9 FL (ref 79–97)
MONOCYTES # BLD AUTO: 0.24 10*3/MM3 (ref 0.1–0.9)
MONOCYTES NFR BLD AUTO: 3 % (ref 5–12)
NEUTROPHILS NFR BLD AUTO: 6.91 10*3/MM3 (ref 1.7–7)
NEUTROPHILS NFR BLD AUTO: 87.1 % (ref 42.7–76)
NRBC BLD AUTO-RTO: 0 /100 WBC (ref 0–0.2)
PATH REPORT.FINAL DX SPEC: NORMAL
PATH REPORT.GROSS SPEC: NORMAL
PLATELET # BLD AUTO: 327 10*3/MM3 (ref 140–450)
PMV BLD AUTO: 9.8 FL (ref 6–12)
POTASSIUM SERPL-SCNC: 4.4 MMOL/L (ref 3.5–5.2)
PROT SERPL-MCNC: 6.6 G/DL (ref 6–8.5)
RBC # BLD AUTO: 3.9 10*6/MM3 (ref 3.77–5.28)
SODIUM SERPL-SCNC: 138 MMOL/L (ref 136–145)
STRESS TARGET HR: 133 BPM
WBC NRBC COR # BLD: 7.94 10*3/MM3 (ref 3.4–10.8)

## 2023-06-16 PROCEDURE — 94799 UNLISTED PULMONARY SVC/PX: CPT

## 2023-06-16 PROCEDURE — 94664 DEMO&/EVAL PT USE INHALER: CPT

## 2023-06-16 PROCEDURE — G0378 HOSPITAL OBSERVATION PER HR: HCPCS

## 2023-06-16 PROCEDURE — 25010000002 PROPOFOL 10 MG/ML EMULSION: Performed by: ANESTHESIOLOGY

## 2023-06-16 PROCEDURE — 93246 EXT ECG>7D<15D RECORDING: CPT

## 2023-06-16 PROCEDURE — 85025 COMPLETE CBC W/AUTO DIFF WBC: CPT | Performed by: SURGERY

## 2023-06-16 PROCEDURE — C1769 GUIDE WIRE: HCPCS | Performed by: INTERNAL MEDICINE

## 2023-06-16 PROCEDURE — 94761 N-INVAS EAR/PLS OXIMETRY MLT: CPT

## 2023-06-16 PROCEDURE — 80053 COMPREHEN METABOLIC PANEL: CPT | Performed by: SURGERY

## 2023-06-16 PROCEDURE — 74328 X-RAY BILE DUCT ENDOSCOPY: CPT

## 2023-06-16 RX ORDER — OXYCODONE HYDROCHLORIDE AND ACETAMINOPHEN 5; 325 MG/1; MG/1
1 TABLET ORAL EVERY 6 HOURS PRN
Qty: 12 TABLET | Refills: 0 | Status: SHIPPED | OUTPATIENT
Start: 2023-06-16 | End: 2023-06-19

## 2023-06-16 RX ORDER — AMOXICILLIN 250 MG
2 CAPSULE ORAL 2 TIMES DAILY
Qty: 20 TABLET | Refills: 0 | Status: SHIPPED | OUTPATIENT
Start: 2023-06-16 | End: 2023-06-21

## 2023-06-16 RX ORDER — SODIUM CHLORIDE 0.9 % (FLUSH) 0.9 %
10 SYRINGE (ML) INJECTION EVERY 12 HOURS SCHEDULED
Status: DISCONTINUED | OUTPATIENT
Start: 2023-06-16 | End: 2023-06-16 | Stop reason: HOSPADM

## 2023-06-16 RX ORDER — LIDOCAINE HYDROCHLORIDE 20 MG/ML
INJECTION, SOLUTION INFILTRATION; PERINEURAL AS NEEDED
Status: DISCONTINUED | OUTPATIENT
Start: 2023-06-16 | End: 2023-06-16 | Stop reason: SURG

## 2023-06-16 RX ORDER — SODIUM CHLORIDE 9 MG/ML
40 INJECTION, SOLUTION INTRAVENOUS AS NEEDED
Status: DISCONTINUED | OUTPATIENT
Start: 2023-06-16 | End: 2023-06-16 | Stop reason: HOSPADM

## 2023-06-16 RX ORDER — ASPIRIN 81 MG/1
81 TABLET ORAL DAILY
Start: 2023-06-30

## 2023-06-16 RX ORDER — ONDANSETRON 4 MG/1
4 TABLET, FILM COATED ORAL EVERY 8 HOURS PRN
Qty: 15 TABLET | Refills: 0 | Status: SHIPPED | OUTPATIENT
Start: 2023-06-16

## 2023-06-16 RX ORDER — ASPIRIN 81 MG/1
81 TABLET ORAL DAILY
Status: ON HOLD | COMMUNITY
End: 2023-06-16 | Stop reason: SDUPTHER

## 2023-06-16 RX ORDER — SODIUM CHLORIDE 0.9 % (FLUSH) 0.9 %
10 SYRINGE (ML) INJECTION AS NEEDED
Status: DISCONTINUED | OUTPATIENT
Start: 2023-06-16 | End: 2023-06-16 | Stop reason: HOSPADM

## 2023-06-16 RX ORDER — SODIUM CHLORIDE 9 MG/ML
30 INJECTION, SOLUTION INTRAVENOUS CONTINUOUS PRN
Status: DISCONTINUED | OUTPATIENT
Start: 2023-06-16 | End: 2023-06-16 | Stop reason: HOSPADM

## 2023-06-16 RX ORDER — PROPOFOL 10 MG/ML
VIAL (ML) INTRAVENOUS AS NEEDED
Status: DISCONTINUED | OUTPATIENT
Start: 2023-06-16 | End: 2023-06-16 | Stop reason: SURG

## 2023-06-16 RX ORDER — SODIUM CHLORIDE, SODIUM LACTATE, POTASSIUM CHLORIDE, CALCIUM CHLORIDE 600; 310; 30; 20 MG/100ML; MG/100ML; MG/100ML; MG/100ML
30 INJECTION, SOLUTION INTRAVENOUS CONTINUOUS PRN
Status: DISCONTINUED | OUTPATIENT
Start: 2023-06-16 | End: 2023-06-16 | Stop reason: HOSPADM

## 2023-06-16 RX ADMIN — PROPOFOL 150 MG: 10 INJECTION, EMULSION INTRAVENOUS at 08:18

## 2023-06-16 RX ADMIN — PROPOFOL 50 MG: 10 INJECTION, EMULSION INTRAVENOUS at 08:21

## 2023-06-16 RX ADMIN — ATORVASTATIN CALCIUM 20 MG: 20 TABLET, FILM COATED ORAL at 09:24

## 2023-06-16 RX ADMIN — SODIUM CHLORIDE, POTASSIUM CHLORIDE, SODIUM LACTATE AND CALCIUM CHLORIDE: 600; 310; 30; 20 INJECTION, SOLUTION INTRAVENOUS at 08:08

## 2023-06-16 RX ADMIN — Medication 10 ML: at 09:24

## 2023-06-16 RX ADMIN — LIDOCAINE HYDROCHLORIDE 45 MG: 20 INJECTION, SOLUTION INFILTRATION; PERINEURAL at 08:18

## 2023-06-16 RX ADMIN — PROPOFOL 200 MCG/KG/MIN: 10 INJECTION, EMULSION INTRAVENOUS at 08:18

## 2023-06-16 RX ADMIN — PANCRELIPASE 36000 UNITS OF LIPASE: 60000; 12000; 38000 CAPSULE, DELAYED RELEASE PELLETS ORAL at 11:25

## 2023-06-16 RX ADMIN — BUDESONIDE AND FORMOTEROL FUMARATE DIHYDRATE 2 PUFF: 160; 4.5 AEROSOL RESPIRATORY (INHALATION) at 11:06

## 2023-06-16 RX ADMIN — SODIUM CHLORIDE 30 ML/HR: 9 INJECTION, SOLUTION INTRAVENOUS at 08:00

## 2023-06-16 NOTE — BRIEF OP NOTE
ENDOSCOPIC RETROGRADE CHOLANGIOPANCREATOGRAPHY  Progress Note    Nani Matias  6/16/2023    Pre-op Diagnosis:   Calculus of gallbladder and bile duct with chronic cholecystitis without obstruction [K80.64]       Post-Op Diagnosis Codes:     * Calculus of gallbladder and bile duct with chronic cholecystitis without obstruction [K80.64]    Procedure/CPT® Codes:        Procedure(s):  ENDOSCOPIC RETROGRADE CHOLANGIOPANCREATOGRAPHY with spichterotomy and ballon sweep              Surgeon(s):  Alfonso Menon MD    Anesthesia: Monitored Anesthesia Care    Staff:   Radiology Technologist: Ade Moralez  Endo Technician: Vicki Mckenzie  Endo Nurse: Starr Davis RN         Estimated Blood Loss: minimal    Urine Voided: * No values recorded between 6/16/2023  8:07 AM and 6/16/2023  8:28 AM *    Specimens:                None          Drains: * No LDAs found *    Findings: ERCP with sphincterotomy balloon stone extraction performed.   film was unremarkable no apparent clips identified.  Endoscopically the ampulla was identified in normal limits.  Cannulation achieved with wire guide first.  Cannula advanced into the proximal biliary tree and cholangiogram performed showing normal-appearing biliary tree.  Cystic duct was not identified.  Sphincterotomy then performed and balloon stone extraction revealed a single bile duct stone.  Cholangiogram was then performed with obstruction using the 12 mm balloon revealing no extravasation.  Patient tolerated well sent to recovery.        Complications: None          Alfonso Menon MD     Date: 6/16/2023  Time: 08:35 EDT

## 2023-06-16 NOTE — ANESTHESIA POSTPROCEDURE EVALUATION
"Patient: Nani Matias    Procedure Summary       Date: 06/16/23 Room / Location:  [facility]    Anesthesia Start: 0807 Anesthesia Stop: 0839    Procedure: ENDOSCOPIC RETROGRADE CHOLANGIOPANCREATOGRAPHY with spichterotomy and ballon sweep Diagnosis:       Calculus of gallbladder and bile duct with chronic cholecystitis without obstruction      (Calculus of gallbladder and bile duct with chronic cholecystitis without obstruction [K80.64])    Surgeons: Alfonso Menon MD Provider: Dakota Mattson DO    Anesthesia Type: MAC ASA Status: 3            Anesthesia Type: MAC    Vitals  Vitals Value Taken Time   /79 06/16/23 0856   Temp     Pulse 74 06/16/23 0856   Resp 16 06/16/23 0856   SpO2 97 % 06/16/23 0856           Post Anesthesia Care and Evaluation    Patient location during evaluation: bedside  Patient participation: complete - patient participated  Level of consciousness: awake and alert  Pain management: adequate    Airway patency: patent  Anesthetic complications: No anesthetic complications    Cardiovascular status: acceptable  Respiratory status: acceptable  Hydration status: acceptable    Comments: /69 (BP Location: Left arm, Patient Position: Sitting)   Pulse 73   Temp 36.5 °C (97.7 °F) (Oral)   Resp 18   Ht 163 cm (64.17\")   Wt 70.1 kg (154 lb 8 oz)   SpO2 95%   BMI 26.38 kg/m²     "

## 2023-06-16 NOTE — CASE MANAGEMENT/SOCIAL WORK
Case Management Discharge Note      Final Note: Home, no additional CCP needs. Mickie RN/CCP    Provided Post Acute Provider List?: N/A  Provided Post Acute Provider Quality & Resource List?: N/A    Selected Continued Care - Admitted Since 6/13/2023       Destination    No services have been selected for the patient.                Durable Medical Equipment    No services have been selected for the patient.                Dialysis/Infusion    No services have been selected for the patient.                Home Medical Care    No services have been selected for the patient.                Therapy    No services have been selected for the patient.                Community Resources    No services have been selected for the patient.                Community & DME    No services have been selected for the patient.                         Final Discharge Disposition Code: 01 - home or self-care

## 2023-06-16 NOTE — DISCHARGE SUMMARY
Patient Name: Nani Matias  : 1960  MRN: [id number]    Date of Admission: 2023  Date of Discharge:  2023  Primary Care Physician: Robyn Morris MD      Chief Complaint:   No chief complaint on file.      Discharge Diagnoses     Active Hospital Problems    Diagnosis  POA    IBS (irritable bowel syndrome) [K58.9]  Yes    COPD (chronic obstructive pulmonary disease) [J44.9]  Yes    Hyperlipidemia [E78.5]  Yes    OZ (obstructive sleep apnea) [G47.33]  Yes      Resolved Hospital Problems    Diagnosis Date Resolved POA    **Calculus of gallbladder and bile duct with chronic cholecystitis without obstruction [K80.64] 2023 Yes    Choledocholithiasis [K80.50] 2023 Yes    Epigastric pain [R10.13] 2023 Yes    Elevated LFTs [R79.89] 2023 Yes    Chest pain [R07.9] 2023 Yes        Hospital Course     Ms. Matias is a 62 y.o. female with a history of COPD, hyperlipidemia, IBS, OZ who presented to [facility] initially complaining of epigastric pain, nausea, vomiting, and palpitations.  Please see the admitting history and physical for further details.  She was found to have elevated LFTs and cholelithiasis on CT scan and was admitted to the hospital for further evaluation and treatment.      She was seen in consultation by gastroenterology, surgery, cardiology.  HIDA scan was normal, and her gallbladder ultrasound showed cholelithiasis and probable adenomyomatosis without evidence of acute cholecystitis, and no intra or extrahepatic biliary dilation was seen.  She underwent laparoscopic cholecystectomy on 6/15/2023.  Unfortunately, the intraoperative cholangiogram did show a distal common bile duct stone, so today on 2023, she underwent ERCP with sphincterotomy and balloon stone extraction.  She is doing well today following her procedures, and if she is able to tolerate a regular diet, she will be discharged home.    For the palpitations,  cardiology recommended a Zio patch at discharge.    Day of Discharge     Subjective:  No events overnight.  She went for ERCP with sphincterotomy and balloon stone extraction this morning.  She is having a little bit of abdominal pain post procedurally, but is up and walking around the room without issue.  No nausea or vomiting.  She has been cleared for discharge as she is able to tolerate a regular diet at lunch today.    Physical Exam:  Temp:  [97.6 °F (36.4 °C)-98.4 °F (36.9 °C)] 97.7 °F (36.5 °C)  Heart Rate:  [64-93] 73  Resp:  [14-21] 18  BP: ()/(55-79) 111/69  Body mass index is 26.38 kg/m².  Physical Exam  Constitutional:       General: She is not in acute distress.     Appearance: She is not toxic-appearing.   Cardiovascular:      Rate and Rhythm: Normal rate and regular rhythm.      Heart sounds: Normal heart sounds.   Pulmonary:      Effort: Pulmonary effort is normal.      Breath sounds: Normal breath sounds.   Abdominal:      General: Bowel sounds are normal. There is no distension.      Palpations: Abdomen is soft.      Tenderness: There is no abdominal tenderness. There is no guarding or rebound.      Comments: Trocar sites CDI   Musculoskeletal:         General: No tenderness.      Right lower leg: No edema.      Left lower leg: No edema.   Neurological:      Mental Status: She is alert.   Psychiatric:         Mood and Affect: Mood normal.         Behavior: Behavior normal.       Consultants     Consult Orders (all) (From admission, onward)       Start     Ordered    06/15/23 1635  Inpatient Gastroenterology Consult  Once        Specialty:  Gastroenterology  Provider:  Alfonso Menon MD    06/15/23 1634    06/14/23 1150  Inpatient Cardiology Consult  Once        Specialty:  Cardiology  Provider:  Rivka Lucio MD    06/14/23 1149    06/14/23 1150  Inpatient General Surgery Consult  Once        Specialty:  General Surgery  Provider:  Karuna Juárez MD    06/14/23 1149    06/14/23  1043  Inpatient Cardiology Consult  Once        Specialty:  Cardiology  Provider:  Fermin Pavon MD    06/14/23 1042    06/13/23 2009  Inpatient Gastroenterology Consult  Once        Specialty:  Gastroenterology  Provider:  Patrick Simmons MD    06/13/23 2009 06/13/23 1613  Inpatient Nutrition Consult  Once        Provider:  (Not yet assigned)    06/13/23 1615                  Procedures     Imaging Results (All)       Procedure Component Value Units Date/Time    FL ercp biliary duct only [[id number]] Collected: 06/16/23 0940     Updated: 06/16/23 0940    Narrative:      ERCP INTERPRETATION ONLY 06/16/2023     HISTORY: ERCP.     FINDINGS: Contrast is seen in the intrahepatic, common hepatic and  common bile ducts. There is a faint filling defect in the common duct  distally. There is mild biliary dilatation. Balloon catheter is seen. No  bile duct strictures are seen.     1595 DAP     FLUOROSCOPY TIME: 2 minutes, 6 images.       FL Cholangiogram Operative [[id number]] Collected: 06/15/23 1548     Updated: 06/15/23 1556    Narrative:      FL CHOLANGIOGRAM OPERATIVE-     INDICATIONS: Operative cholangiography     TECHNIQUE: FLUOROSCOPIC ASSISTANCE IN THE OPERATING ROOM.     FINDINGS:     97 intraoperative fluoroscopic spot views were obtained and recorded the  PACS for review, revealing opacification of cystic duct remnant,  extrahepatic and central intrahepatic biliary ducts.. No biliary  obstruction. A persistent appearance of filling defect in the distal  common biliary duct raises suspicion for choledocholithiasis, MRCP  correlation can be obtained as indicated. Please see operative report  for full details.     Fluoroscopy time: 16.7 seconds     Radiation exposure: 4.5 mgy          Impression:         As described.     This report was finalized on 6/15/2023 3:53 PM by Dr. Leopoldo Quick M.D.        Gallbladder [[id number]] Collected: 06/14/23 2102     Updated: 06/14/23 2113    Narrative:      GALLBLADDER  ULTRASOUND     HISTORY: Cholelithiasis. Epigastric abdominal pain.     COMPARISON: None available.     TECHNIQUE: Grayscale and color Doppler sonographic images were obtained  through the right upper quadrant.     FINDINGS:  Visualized portions of the pancreas appear normal. Hepatic echotexture  is normal, other than a cyst within the liver. No additional follow-up  is necessary. Main portal vein is patent. Direction of flow appears to  be hepatopedal. The patient is noted have multiple stones within the  gallbladder, although there also appears to be some adenomyomatosis as  well. Gallbladder wall does not appear particularly thickened, it  measures up to 3 mm. There is no pericholecystic fluid. There is no  intra or extrahepatic biliary dilatation. Common bile duct measures 6  mm. Right kidney measures 9.3 x 5.5 x 5.3 cm. There is no  hydronephrosis. Renal echotexture is normal.       Impression:         1. Cholelithiasis and probable adenomyomatosis. No evidence of acute  cholecystitis.  2. No intra or extrahepatic biliary dilatation seen.     This report was finalized on 6/14/2023 9:08 PM by Dr. Nicky Landers M.D.       NM HIDA SCAN WITH PHARMACOLOGICAL INTERVENTION [[id number]] Collected: 06/14/23 1117     Updated: 06/14/23 1215    Narrative:      NUCLEAR MEDICINE HIDA SCAN WITH PHARMACOLOGIC INTERVENTION     HISTORY: Abdominal pain.     TECHNIQUE: 45 minute anterior dynamic imaging over the abdomen was  performed after injection of 5.7 mCi Tc-choletec IV.  1.4 mcg Kinevac  was administered IV to determine gallbladder ejection fraction.     FINDINGS: There is normal hepatic uptake and excretion with appropriate  clearance of background blood pool activity. There is normal  visualization of biliary tract, gallbladder, small bowel. After CCK  infusion, gallbladder ejection fraction is calculated to be  91%   (normal range is considered 35% or greater)       Impression:      1. No common duct or cystic duct  obstruction.  2. Gallbladder ejection fraction measures 91 % which is normal.     This report was finalized on 6/14/2023 12:12 PM by Dr. Scott Rich M.D.               Pertinent Labs     Results from last 7 days   Lab Units 06/16/23  0314 06/15/23  0346 06/14/23  0320   WBC 10*3/mm3 7.94 7.14 7.83   HEMOGLOBIN g/dL 12.6 13.8 13.7   PLATELETS 10*3/mm3 327 312 329     Results from last 7 days   Lab Units 06/16/23  0314 06/15/23  0346 06/14/23  0320 06/13/23  1907   SODIUM mmol/L 138 140 138  --    POTASSIUM mmol/L 4.4 3.8 3.7  --    CHLORIDE mmol/L 102 104 102  --    CO2 mmol/L 21.9* 22.4 22.6  --    BUN mg/dL 15 15 13  --    CREATININE mg/dL 0.60 0.76 0.86 0.75   GLUCOSE mg/dL 115* 83 83  --    Estimated Creatinine Clearance: 91.4 mL/min (by C-G formula based on SCr of 0.6 mg/dL).  Results from last 7 days   Lab Units 06/16/23  0314 06/15/23  0346 06/14/23  0320   ALBUMIN g/dL 4.0 4.0 4.2   BILIRUBIN mg/dL 0.4 0.6 0.7   ALK PHOS U/L 110 128* 152*   AST (SGOT) U/L 66* 64* 136*   ALT (SGPT) U/L 293* 382* 574*     Results from last 7 days   Lab Units 06/16/23  0314 06/15/23  0346 06/14/23  0320   CALCIUM mg/dL 9.4 9.6 9.6   ALBUMIN g/dL 4.0 4.0 4.2     Results from last 7 days   Lab Units 06/13/23  0908   LIPASE Units/Liter 23     Results from last 7 days   Lab Units 06/13/23 2117 06/13/23  1907   HSTROP T ng/L 9 7           Invalid input(s): LDLCALC        Test Results Pending at Discharge       Discharge Details        Discharge Medications        New Medications        Instructions Start Date   oxyCODONE-acetaminophen 5-325 MG per tablet  Commonly known as: PERCOCET   1 tablet, Oral, Every 6 Hours PRN      sennosides-docusate 8.6-50 MG per tablet  Commonly known as: PERICOLACE   2 tablets, Oral, 2 Times Daily             Changes to Medications        Instructions Start Date   aspirin 81 MG EC tablet  What changed: These instructions start on June 30, 2023. If you are unsure what to do until then, ask your  doctor or other care provider.   81 mg, Oral, Daily   Start Date: June 30, 2023            Continue These Medications        Instructions Start Date   albuterol sulfate  (90 Base) MCG/ACT inhaler  Commonly known as: PROVENTIL HFA;VENTOLIN HFA;PROAIR HFA   2 puffs, Inhalation, Every 4 Hours PRN      atorvastatin 20 MG tablet  Commonly known as: LIPITOR   20 mg, Oral, Daily      budesonide-formoterol 160-4.5 MCG/ACT inhaler  Commonly known as: SYMBICORT   2 puffs, Inhalation, 2 Times Daily - RT      dicyclomine 10 MG capsule  Commonly known as: BENTYL   10 mg, Oral, As Needed      nortriptyline 25 MG capsule  Commonly known as: PAMELOR   25 mg, Oral, Nightly      ondansetron 4 MG tablet  Commonly known as: ZOFRAN   4 mg, Oral, Every 8 Hours PRN      pancrelipase (Lip-Prot-Amyl) 98330-43071 units capsule delayed-release particles capsule  Commonly known as: CREON   36,000 units of lipase, Oral, 3 Times Daily With Meals      Vitamin D2 50 MCG (2000 UT) tablet   1 tablet, Oral, Weekly               Allergies   Allergen Reactions    Penicillins Angioedema and Hives     as a child    Shellfish-Derived Products Anaphylaxis    Levofloxacin GI Intolerance, Diarrhea and Nausea Only         Discharge Disposition:  Home or Self Care    Discharge Diet:  Diet Order   Procedures    Diet: Cardiac Diets; Healthy Heart (2-3 Na+); Texture: Regular Texture (IDDSI 7); Fluid Consistency: Thin (IDDSI 0)       Discharge Activity:   Activity Instructions       Activity as Tolerated              CODE STATUS:    Code Status and Medical Interventions:   Ordered at: 06/13/23 3416     Code Status (Patient has no pulse and is not breathing):    CPR (Attempt to Resuscitate)     Medical Interventions (Patient has pulse or is breathing):    Full       No future appointments.  Additional Instructions for the Follow-ups that You Need to Schedule       Call MD With Problems / Concerns   As directed      Instructions: return to the hospital if you  experience chest pain, shortness of breath, abdominal pain, nausea, vomiting, fevers, sweats, chills, or worsening of your symptoms    Order Comments: Instructions: return to the hospital if you experience chest pain, shortness of breath, abdominal pain, nausea, vomiting, fevers, sweats, chills, or worsening of your symptoms          Discharge Follow-up with PCP   As directed       Currently Documented PCP:    Robyn Morris MD    PCP Phone Number:    [phone]     Follow Up Details: 2 weeks         Discharge Follow-up with Specialty: general surgery, 1-2 weeks or as otherwise directed   As directed      Specialty: general surgery, 1-2 weeks or as otherwise directed         Discharge Follow-up with Specified Provider: gastroenterology 1-2 weeks or as otherwise directed   As directed      To: gastroenterology 1-2 weeks or as otherwise directed                Follow-up Information       Robyn Morris MD .    Specialty: Internal Medicine  Why: 2 weeks  Contact information:  [address] [address]  [phone]                             Additional Instructions for the Follow-ups that You Need to Schedule       Call MD With Problems / Concerns   As directed      Instructions: return to the hospital if you experience chest pain, shortness of breath, abdominal pain, nausea, vomiting, fevers, sweats, chills, or worsening of your symptoms    Order Comments: Instructions: return to the hospital if you experience chest pain, shortness of breath, abdominal pain, nausea, vomiting, fevers, sweats, chills, or worsening of your symptoms          Discharge Follow-up with PCP   As directed       Currently Documented PCP:    Robyn Morris MD    PCP Phone Number:    [phone]     Follow Up Details: 2 weeks         Discharge Follow-up with Specialty: general surgery, 1-2 weeks or as otherwise directed   As directed      Specialty: general surgery, 1-2 weeks or as otherwise directed          Discharge Follow-up with Specified Provider: gastroenterology 1-2 weeks or as otherwise directed   As directed      To: gastroenterology 1-2 weeks or as otherwise directed             Time Spent on Discharge:  Greater than 30 minutes      Beau Alicea MD  [facility]  06/16/23  12:24 EDT

## 2023-06-16 NOTE — ANESTHESIA PREPROCEDURE EVALUATION
Anesthesia Evaluation     Patient summary reviewed   no history of anesthetic complications:   NPO Solid Status: > 8 hours  NPO Liquid Status: > 2 hours           Airway   Mallampati: II  no difficulty expected  Comment: 2b view w/ DL yest  Dental - normal exam     Pulmonary     breath sounds clear to auscultation  (+) a smoker Former, COPD,sleep apnea on CPAP  (-) asthma    PE comment: nonlabored  Cardiovascular   Exercise tolerance: good (4-7 METS)    Rhythm: regular  Rate: normal    (+) dysrhythmias Tachycardia, hyperlipidemia  (-) past MI, angina    ROS comment: Echo 6/13/23:  ·  Left ventricular systolic function is normal. Calculated left ventricular EF = 62.4%  ·  Left ventricular diastolic function was normal.  ·  Estimated right ventricular systolic pressure from tricuspid regurgitation is normal (<35 mmHg).         Neuro/Psych  (-) seizures, TIA, CVA  GI/Hepatic/Renal/Endo    (+) liver disease history of elevated LFT  (-)  obesity, GERD, no renal disease, diabetes, no thyroid disorder    ROS Comment: Gallstones    Musculoskeletal     (-) neck stiffness  Abdominal    Substance History      OB/GYN          Other        ROS/Med Hx Other: Lap rahat yest                  Anesthesia Plan    ASA 3     MAC     (MAC anesthesia discussed with patient and/or patient representative. Risks (including but not limited to intra-op awareness), benefits, and alternatives were discussed. Understanding was voiced with an agreement to proceed with a MAC technique and General as a backup option. )    Anesthetic plan, risks, benefits, and alternatives have been provided, discussed and informed consent has been obtained with: patient.    CODE STATUS:    Code Status (Patient has no pulse and is not breathing): CPR (Attempt to Resuscitate)  Medical Interventions (Patient has pulse or is breathing): Full

## 2023-07-10 LAB
MAXIMAL PREDICTED HEART RATE: 156 BPM
STRESS TARGET HR: 133 BPM

## 2023-07-21 ENCOUNTER — TELEPHONE (OUTPATIENT)
Dept: CARDIOLOGY | Facility: CLINIC | Age: 63
End: 2023-07-21

## 2023-07-21 NOTE — TELEPHONE ENCOUNTER
Caller: Nani Matias    Relationship to patient: Self    Best call back number: [phone]    Patient is needing: PATIENT SEEN DR BARONE IN THE HOSPITAL. SHE WORE A ZIO PATCH MONITOR AND DID NOT KNOW IF SHE NEEDED TO BE SEEN IN THE OFFICE FOR RESULTS.

## 2023-07-21 NOTE — TELEPHONE ENCOUNTER
The monitor was ordered by Dr. Jacobs who is not in our office so that is why she has not been called.  The monitor shows benign results with a 9 beat run of heart racing but otherwise normal rhythm.  At this time, no need to follow-up in our office

## 2023-09-06 ENCOUNTER — OFFICE (OUTPATIENT)
Dept: URBAN - METROPOLITAN AREA CLINIC 66 | Facility: CLINIC | Age: 63
End: 2023-09-06
Payer: COMMERCIAL

## 2023-09-06 VITALS
WEIGHT: 159 LBS | SYSTOLIC BLOOD PRESSURE: 105 MMHG | DIASTOLIC BLOOD PRESSURE: 64 MMHG | HEIGHT: 65 IN | HEART RATE: 68 BPM

## 2023-09-06 DIAGNOSIS — F32.A DEPRESSION, UNSPECIFIED: ICD-10-CM

## 2023-09-06 DIAGNOSIS — K91.5 POSTCHOLECYSTECTOMY SYNDROME: ICD-10-CM

## 2023-09-06 DIAGNOSIS — K58.9 IRRITABLE BOWEL SYNDROME WITHOUT DIARRHEA: ICD-10-CM

## 2023-09-06 PROCEDURE — 99214 OFFICE O/P EST MOD 30 MIN: CPT | Performed by: INTERNAL MEDICINE

## 2023-09-06 RX ORDER — MIRTAZAPINE 15 MG/1
15 TABLET, FILM COATED ORAL
Qty: 30 | Refills: 5 | Status: ACTIVE
Start: 2023-09-06

## 2024-08-13 ENCOUNTER — TRANSCRIBE ORDERS (OUTPATIENT)
Dept: ADMINISTRATIVE | Facility: HOSPITAL | Age: 64
End: 2024-08-13
Payer: COMMERCIAL

## 2024-08-13 DIAGNOSIS — Z87.891 PERSONAL HISTORY OF TOBACCO USE, PRESENTING HAZARDS TO HEALTH: ICD-10-CM

## 2024-08-27 ENCOUNTER — HOSPITAL ENCOUNTER (OUTPATIENT)
Dept: CT IMAGING | Facility: HOSPITAL | Age: 64
Discharge: HOME | End: 2024-08-27
Admitting: INTERNAL MEDICINE
Payer: MEDICARE

## 2024-08-27 DIAGNOSIS — Z87.891 PERSONAL HISTORY OF TOBACCO USE, PRESENTING HAZARDS TO HEALTH: ICD-10-CM

## 2024-08-27 PROCEDURE — 71271 CT THORAX LUNG CANCER SCR C-: CPT

## 2024-09-11 ENCOUNTER — OFFICE (OUTPATIENT)
Age: 64
End: 2024-09-11

## 2024-09-11 ENCOUNTER — OFFICE (OUTPATIENT)
Dept: URBAN - METROPOLITAN AREA CLINIC 76 | Facility: CLINIC | Age: 64
End: 2024-09-11

## 2024-09-11 VITALS
DIASTOLIC BLOOD PRESSURE: 74 MMHG | WEIGHT: 165 LBS | HEIGHT: 65 IN | HEART RATE: 89 BPM | HEIGHT: 65 IN | HEIGHT: 65 IN | HEART RATE: 89 BPM | OXYGEN SATURATION: 96 % | OXYGEN SATURATION: 96 % | DIASTOLIC BLOOD PRESSURE: 74 MMHG | SYSTOLIC BLOOD PRESSURE: 118 MMHG | SYSTOLIC BLOOD PRESSURE: 118 MMHG | SYSTOLIC BLOOD PRESSURE: 118 MMHG | DIASTOLIC BLOOD PRESSURE: 74 MMHG | DIASTOLIC BLOOD PRESSURE: 74 MMHG | WEIGHT: 165 LBS | WEIGHT: 165 LBS | HEIGHT: 65 IN | HEIGHT: 65 IN | WEIGHT: 165 LBS | OXYGEN SATURATION: 96 % | OXYGEN SATURATION: 96 % | DIASTOLIC BLOOD PRESSURE: 74 MMHG | DIASTOLIC BLOOD PRESSURE: 74 MMHG | WEIGHT: 165 LBS | OXYGEN SATURATION: 96 % | HEART RATE: 89 BPM | HEIGHT: 65 IN | SYSTOLIC BLOOD PRESSURE: 118 MMHG | HEART RATE: 89 BPM | SYSTOLIC BLOOD PRESSURE: 118 MMHG | WEIGHT: 165 LBS | HEART RATE: 89 BPM | DIASTOLIC BLOOD PRESSURE: 74 MMHG | HEIGHT: 65 IN | OXYGEN SATURATION: 96 % | WEIGHT: 165 LBS | HEART RATE: 89 BPM | HEART RATE: 89 BPM | OXYGEN SATURATION: 96 % | SYSTOLIC BLOOD PRESSURE: 118 MMHG | SYSTOLIC BLOOD PRESSURE: 118 MMHG

## 2024-09-11 DIAGNOSIS — K91.5 POSTCHOLECYSTECTOMY SYNDROME: ICD-10-CM

## 2024-09-11 DIAGNOSIS — R15.2 FECAL URGENCY: ICD-10-CM

## 2024-09-11 DIAGNOSIS — K58.0 IRRITABLE BOWEL SYNDROME WITH DIARRHEA: ICD-10-CM

## 2024-09-11 PROCEDURE — 99213 OFFICE O/P EST LOW 20 MIN: CPT | Performed by: INTERNAL MEDICINE

## 2025-04-30 ENCOUNTER — OFFICE (OUTPATIENT)
Dept: URBAN - METROPOLITAN AREA CLINIC 76 | Facility: CLINIC | Age: 65
End: 2025-04-30

## 2025-04-30 ENCOUNTER — OFFICE (OUTPATIENT)
Dept: URBAN - METROPOLITAN AREA CLINIC 76 | Facility: CLINIC | Age: 65
End: 2025-04-30
Payer: MEDICARE

## 2025-04-30 DIAGNOSIS — K58.0 IRRITABLE BOWEL SYNDROME WITH DIARRHEA: ICD-10-CM

## 2025-04-30 PROCEDURE — 99426 PRIN CARE MGMT STAFF 1ST 30: CPT | Performed by: INTERNAL MEDICINE

## 2025-05-06 ENCOUNTER — OFFICE (OUTPATIENT)
Dept: URBAN - METROPOLITAN AREA CLINIC 76 | Facility: CLINIC | Age: 65
End: 2025-05-06
Payer: MEDICARE

## 2025-05-06 VITALS
SYSTOLIC BLOOD PRESSURE: 113 MMHG | HEART RATE: 81 BPM | DIASTOLIC BLOOD PRESSURE: 77 MMHG | WEIGHT: 164 LBS | OXYGEN SATURATION: 98 % | HEIGHT: 65 IN

## 2025-05-06 DIAGNOSIS — K58.0 IRRITABLE BOWEL SYNDROME WITH DIARRHEA: ICD-10-CM

## 2025-05-06 DIAGNOSIS — R11.0 NAUSEA: ICD-10-CM

## 2025-05-06 DIAGNOSIS — K86.81 EXOCRINE PANCREATIC INSUFFICIENCY: ICD-10-CM

## 2025-05-06 DIAGNOSIS — E53.8 DEFICIENCY OF OTHER SPECIFIED B GROUP VITAMINS: ICD-10-CM

## 2025-05-06 PROCEDURE — 99214 OFFICE O/P EST MOD 30 MIN: CPT | Performed by: INTERNAL MEDICINE

## 2025-05-06 RX ORDER — SUCRALFATE 1 G/1
3 TABLET ORAL
Qty: 90 | Refills: 3 | Status: ACTIVE
Start: 2025-05-06

## 2025-07-31 ENCOUNTER — OFFICE (OUTPATIENT)
Dept: URBAN - METROPOLITAN AREA CLINIC 76 | Facility: CLINIC | Age: 65
End: 2025-07-31
Payer: MEDICARE

## 2025-07-31 DIAGNOSIS — K86.81 EXOCRINE PANCREATIC INSUFFICIENCY: ICD-10-CM

## 2025-07-31 DIAGNOSIS — K58.0 IRRITABLE BOWEL SYNDROME WITH DIARRHEA: ICD-10-CM

## 2025-07-31 PROCEDURE — 99490 CHRNC CARE MGMT STAFF 1ST 20: CPT | Performed by: INTERNAL MEDICINE

## 2025-07-31 PROCEDURE — 99439 CHRNC CARE MGMT STAF EA ADDL: CPT | Performed by: INTERNAL MEDICINE

## (undated) DEVICE — CANN O2 ETCO2 FITS ALL CONN CO2 SMPL A/ 7IN DISP LF

## (undated) DEVICE — GLV SURG BIOGEL LTX PF 6

## (undated) DEVICE — DEV LK WIREGUIDE FUSN OLYMP SCP

## (undated) DEVICE — DRAPE,REIN 53X77,STERILE: Brand: MEDLINE

## (undated) DEVICE — LOU LAP CHOLE: Brand: MEDLINE INDUSTRIES, INC.

## (undated) DEVICE — ENDOPOUCH RETRIEVER SPECIMEN RETRIEVAL BAGS: Brand: ENDOPOUCH RETRIEVER

## (undated) DEVICE — LAPAROVUE VISIBILITY SYSTEM LAPAROSCOPIC SOLUTIONS: Brand: LAPAROVUE

## (undated) DEVICE — ADHS SKIN SURG TISS VISC PREMIERPRO EXOFIN HI/VISC FAST/DRY

## (undated) DEVICE — CATH CHOLANG 4.5F18IN BRGNDY

## (undated) DEVICE — STPCK 3WY D201 DISCOFIX

## (undated) DEVICE — EXTENSION SET, MALE LUER LOCK ADAPTER WITH RETRACTABLE COLLAR

## (undated) DEVICE — KT ORCA ORCAPOD DISP STRL

## (undated) DEVICE — ENDOPATH XCEL BLADELESS TROCARS WITH STABILITY SLEEVES: Brand: ENDOPATH XCEL

## (undated) DEVICE — SUT VIC 0/0 UR6 27IN DYED J603H

## (undated) DEVICE — SOL NACL 0.9PCT 1000ML

## (undated) DEVICE — BITEBLOCK OMNI BLOC

## (undated) DEVICE — Device

## (undated) DEVICE — SINGLE USE DISTAL COVER MAJ-2315: Brand: SINGLE USE DISTAL COVER

## (undated) DEVICE — ENDOCUT SCISSOR TIP, DISPOSABLE: Brand: RENEW

## (undated) DEVICE — RETRIEVAL BALLOON CATHETER: Brand: EXTRACTOR™ PRO RX

## (undated) DEVICE — ENDOPATH XCEL BLUNT TIP TROCARS WITH SMOOTH SLEEVES: Brand: ENDOPATH XCEL

## (undated) DEVICE — CONTAINER,SPECIMEN,OR STERILE,4OZ: Brand: MEDLINE

## (undated) DEVICE — GLV SURG SENSICARE POLYISPRN W/ALOE PF LF 6.5 GRN STRL

## (undated) DEVICE — ANTIBACTERIAL UNDYED BRAIDED (POLYGLACTIN 910), SYNTHETIC ABSORBABLE SUTURE: Brand: COATED VICRYL

## (undated) DEVICE — GOWN,SIRUS,NON REINFRCD,LARGE,SET IN SL: Brand: MEDLINE

## (undated) DEVICE — ADAPT CLN BIOGUARD AIR/H2O DISP

## (undated) DEVICE — SPHINCTEROTOME: Brand: HYDRATOME RX 44

## (undated) DEVICE — SENSR O2 OXIMAX FNGR A/ 18IN NONSTR

## (undated) DEVICE — TUBING, SUCTION, 1/4" X 10', STRAIGHT: Brand: MEDLINE

## (undated) DEVICE — DISPOSABLE MONOPOLAR ENDOSCOPIC CORD 10 FT. (3M): Brand: KIRWAN

## (undated) DEVICE — ENDOPATH XCEL UNIVERSAL TROCAR STABLILITY SLEEVES: Brand: ENDOPATH XCEL

## (undated) DEVICE — CATH IV INSYTE AUTOGARD 14G 1 1/2IN ORNG

## (undated) DEVICE — DRP C/ARM 41X74IN

## (undated) DEVICE — LN SMPL CO2 SHTRM SD STREAM W/M LUER

## (undated) DEVICE — APPL CHLORAPREP HI/LITE 26ML ORNG

## (undated) DEVICE — ERBE NESSY®PLATE 170 SPLIT; 168CM²; CABLE 3M: Brand: ERBE